# Patient Record
Sex: MALE | Race: WHITE | URBAN - METROPOLITAN AREA
[De-identification: names, ages, dates, MRNs, and addresses within clinical notes are randomized per-mention and may not be internally consistent; named-entity substitution may affect disease eponyms.]

---

## 2018-05-09 ENCOUNTER — HOSPITAL ENCOUNTER (OUTPATIENT)
Dept: GENERAL RADIOLOGY | Age: 53
Discharge: HOME | End: 2018-05-09

## 2018-05-09 DIAGNOSIS — Z00.00 ANNUAL PHYSICAL EXAM: ICD-10-CM

## 2023-07-20 ENCOUNTER — OFFICE VISIT (OUTPATIENT)
Dept: PRIMARY CARE | Facility: CLINIC | Age: 58
End: 2023-07-20
Payer: COMMERCIAL

## 2023-07-20 VITALS
BODY MASS INDEX: 27.07 KG/M2 | DIASTOLIC BLOOD PRESSURE: 79 MMHG | OXYGEN SATURATION: 96 % | HEART RATE: 69 BPM | HEIGHT: 68 IN | WEIGHT: 178.6 LBS | RESPIRATION RATE: 20 BRPM | SYSTOLIC BLOOD PRESSURE: 123 MMHG

## 2023-07-20 DIAGNOSIS — Z91.89 FRAMINGHAM CARDIAC RISK <10% IN NEXT 10 YEARS: ICD-10-CM

## 2023-07-20 DIAGNOSIS — E78.2 MIXED HYPERLIPIDEMIA: ICD-10-CM

## 2023-07-20 DIAGNOSIS — R73.9 BORDERLINE HYPERGLYCEMIA: ICD-10-CM

## 2023-07-20 DIAGNOSIS — I10 ESSENTIAL HYPERTENSION: ICD-10-CM

## 2023-07-20 DIAGNOSIS — E87.6 HYPOKALEMIA: ICD-10-CM

## 2023-07-20 DIAGNOSIS — E66.3 OVERWEIGHT (BMI 25.0-29.9): ICD-10-CM

## 2023-07-20 DIAGNOSIS — E53.8 VITAMIN B12 DEFICIENCY: ICD-10-CM

## 2023-07-20 DIAGNOSIS — R07.89 CHEST WALL PAIN: Primary | ICD-10-CM

## 2023-07-20 DIAGNOSIS — Z11.59 ENCOUNTER FOR HEPATITIS C SCREENING TEST FOR LOW RISK PATIENT: ICD-10-CM

## 2023-07-20 DIAGNOSIS — E55.9 VITAMIN D DEFICIENCY: ICD-10-CM

## 2023-07-20 PROBLEM — K63.5 SERRATED POLYP OF COLON: Status: ACTIVE | Noted: 2023-07-20

## 2023-07-20 PROBLEM — G43.009 MIGRAINE WITHOUT AURA AND WITHOUT STATUS MIGRAINOSUS, NOT INTRACTABLE: Status: ACTIVE | Noted: 2023-07-20

## 2023-07-20 PROBLEM — N52.9 ERECTILE DYSFUNCTION: Status: ACTIVE | Noted: 2023-07-20

## 2023-07-20 PROBLEM — R06.83 SNORING: Status: ACTIVE | Noted: 2023-07-20

## 2023-07-20 PROBLEM — R91.8 LUNG NODULE, MULTIPLE: Status: ACTIVE | Noted: 2023-07-20

## 2023-07-20 PROBLEM — K76.0 HEPATIC STEATOSIS: Status: ACTIVE | Noted: 2023-07-20

## 2023-07-20 PROBLEM — N20.1 URETERAL CALCULUS: Status: ACTIVE | Noted: 2019-10-24

## 2023-07-20 PROBLEM — K21.9 GERD (GASTROESOPHAGEAL REFLUX DISEASE): Status: ACTIVE | Noted: 2023-07-20

## 2023-07-20 PROCEDURE — 93000 ELECTROCARDIOGRAM COMPLETE: CPT | Performed by: INTERNAL MEDICINE

## 2023-07-20 PROCEDURE — 3074F SYST BP LT 130 MM HG: CPT | Performed by: INTERNAL MEDICINE

## 2023-07-20 PROCEDURE — 99214 OFFICE O/P EST MOD 30 MIN: CPT | Performed by: INTERNAL MEDICINE

## 2023-07-20 PROCEDURE — 1036F TOBACCO NON-USER: CPT | Performed by: INTERNAL MEDICINE

## 2023-07-20 PROCEDURE — 3078F DIAST BP <80 MM HG: CPT | Performed by: INTERNAL MEDICINE

## 2023-07-20 RX ORDER — LANOLIN ALCOHOL/MO/W.PET/CERES
CREAM (GRAM) TOPICAL
COMMUNITY
Start: 2023-03-28

## 2023-07-20 RX ORDER — CYCLOBENZAPRINE HCL 5 MG
TABLET ORAL
Qty: 30 TABLET | Refills: 0 | Status: SHIPPED | OUTPATIENT
Start: 2023-07-20

## 2023-07-20 RX ORDER — POTASSIUM CHLORIDE 750 MG/1
TABLET, EXTENDED RELEASE ORAL
COMMUNITY
End: 2023-11-07 | Stop reason: SDUPTHER

## 2023-07-20 RX ORDER — ASPIRIN 81 MG/1
TABLET ORAL
Qty: 30 TABLET | Refills: 1 | Status: SHIPPED | OUTPATIENT
Start: 2023-07-20

## 2023-07-20 RX ORDER — PROPRANOLOL HYDROCHLORIDE 120 MG/1
120 CAPSULE, EXTENDED RELEASE ORAL DAILY
COMMUNITY
Start: 2023-04-01 | End: 2024-01-26 | Stop reason: SDUPTHER

## 2023-07-20 RX ORDER — ASCORBIC ACID 500 MG
500 TABLET ORAL DAILY
Qty: 30 TABLET | Refills: 11 | COMMUNITY
Start: 2023-07-20

## 2023-07-20 RX ORDER — CHOLECALCIFEROL (VITAMIN D3) 50 MCG
50 TABLET ORAL DAILY
Qty: 30 TABLET | Refills: 11 | COMMUNITY
Start: 2023-07-20

## 2023-07-20 RX ORDER — CHLORTHALIDONE 25 MG/1
25 TABLET ORAL
COMMUNITY
End: 2023-11-27

## 2023-07-20 RX ORDER — TADALAFIL 10 MG/1
TABLET ORAL
COMMUNITY
End: 2024-01-26 | Stop reason: SDUPTHER

## 2023-07-20 NOTE — PROGRESS NOTES
Patient is here for routine  visitSubjective   Patient ID: Figueroa Cabral is a 58 y.o. male who presents for Follow-up.    HPI     Review of Systems    Objective   There were no vitals taken for this visit.    Physical Exam    Assessment/Plan        Thank you very much for coming.  I am very happy to see you.    We are going to do a twelve-lead EKG today to determine whether or not we have to worry about this chest discomfort that you have been having.  The presentation of course is ATYPICAL, but you do have the usual risk factors, age/gender, history of cholesterol elevation, history of elevated blood pressure.  Still, you do not smoke, and again, the pain is more superficial.    The next step will be to do a STRESS test, as well as a CARDIAC CT CALCIUM SCORE.  These tests will help determine your overall cardiovascular risk, and whether or not you need to be seen by CARDIOLOGY.    In the meantime, taking into account your age, gender, blood pressure, and latest laboratory examinations, and the fact that you do not smoke, and are not diabetic, your overall risk for heart attack or stroke over the next 10 years is only 5.7% any value less than 10% is very good, and any value less than 7.5% is excellent!  We will just have to complete the proper work-up, and if nothing comes up, then we do not have to worry about any further cardiovascular work-up.    ALSO, it is time to update your FASTING laboratory examinations, to be done at [facility].  I will call you with results and any possible changes.    Please come back in 2 months, so that we can review how you have been, and so that we can also review your preventive strategies, and prepare you for autumn/winter.    Until then, it will benefit you to start a BABY ASPIRIN with BREAKFAST every morning.  Always with food please.  You can even take 1 baby aspirin tonight with dinner.    ALSO, you will benefit from SHINGLES vaccination anytime soon.  The SHINGRIX  series comes into injections at least 1 to 2 months apart.  Get these done now while the weather is good, and the influenza season has not yet started.  Please contact your local pharmacy and get it scheduled.  They will be very happy to accommodate you.    CYCLOBENZAPRINE 5 mg every 6-8 hours as needed for musculoskeletal pain can help your chest wall discomfort.  Also, you can get yourself some BIOFREEZE over-the-counter.  Please read the instructions for proper use and maximum good effect.    Please continue to take care of yourself and your family, and please continue to pray for our recovery from this pandemic.  I hope you enjoy the rest of your summer.  Say deepika to Dann for me!            0  Return in 2 months.  20 minutes please.  Reassess overall cardiovascular risk, preventive strategies.  Prepare for fall/winter.            0

## 2023-07-20 NOTE — PATIENT INSTRUCTIONS
Thank you very much for coming.  I am very happy to see you.    We are going to do a twelve-lead EKG today to determine whether or not we have to worry about this chest discomfort that you have been having.  The presentation of course is ATYPICAL, but you do have the usual risk factors, age/gender, history of cholesterol elevation, history of elevated blood pressure.  Still, you do not smoke, and again, the pain is more superficial.    The next step will be to do a STRESS test, as well as a CARDIAC CT CALCIUM SCORE.  These tests will help determine your overall cardiovascular risk, and whether or not you need to be seen by CARDIOLOGY.    In the meantime, taking into account your age, gender, blood pressure, and latest laboratory examinations, and the fact that you do not smoke, and are not diabetic, your overall risk for heart attack or stroke over the next 10 years is only 5.7% any value less than 10% is very good, and any value less than 7.5% is excellent!  We will just have to complete the proper work-up, and if nothing comes up, then we do not have to worry about any further cardiovascular work-up.    ALSO, it is time to update your FASTING laboratory examinations, to be done at [facility].  I will call you with results and any possible changes.    Please come back in 2 months, so that we can review how you have been, and so that we can also review your preventive strategies, and prepare you for rios/winter.    Until then, it will benefit you to start a BABY ASPIRIN with BREAKFAST every morning.  Always with food please.  You can even take 1 baby aspirin tonight with dinner.    ALSO, you will benefit from SHINGLES vaccination anytime soon.  The SHINGRIX series comes into injections at least 1 to 2 months apart.  Get these done now while the weather is good, and the influenza season has not yet started.  Please contact your local pharmacy and get it scheduled.  They will be very happy to accommodate  you.    CYCLOBENZAPRINE 5 mg every 6-8 hours as needed for musculoskeletal pain can help your chest wall discomfort.  Also, you can get yourself some BIOFREEZE over-the-counter.  Please read the instructions for proper use and maximum good effect.    Please continue to take care of yourself and your family, and please continue to pray for our recovery from this pandemic.  I hope you enjoy the rest of your summer.  Say deepika to Dann for me!            0  Return in 2 months.  20 minutes please.  Reassess overall cardiovascular risk, preventive strategies.  Prepare for fall/winter.            0

## 2023-08-01 ENCOUNTER — TELEPHONE (OUTPATIENT)
Dept: PRIMARY CARE | Facility: CLINIC | Age: 58
End: 2023-08-01
Payer: COMMERCIAL

## 2023-09-15 ENCOUNTER — LAB (OUTPATIENT)
Dept: LAB | Facility: LAB | Age: 58
End: 2023-09-15
Payer: COMMERCIAL

## 2023-09-15 LAB
ALANINE AMINOTRANSFERASE (SGPT) (U/L) IN SER/PLAS: 18 U/L (ref 10–52)
ALBUMIN (G/DL) IN SER/PLAS: 4.5 G/DL (ref 3.4–5)
ALKALINE PHOSPHATASE (U/L) IN SER/PLAS: 58 U/L (ref 33–120)
ANION GAP IN SER/PLAS: 13 MMOL/L (ref 10–20)
APPEARANCE, URINE: CLEAR
ASPARTATE AMINOTRANSFERASE (SGOT) (U/L) IN SER/PLAS: 20 U/L (ref 9–39)
BASOPHILS (10*3/UL) IN BLOOD BY AUTOMATED COUNT: 0.09 X10E9/L (ref 0–0.1)
BASOPHILS/100 LEUKOCYTES IN BLOOD BY AUTOMATED COUNT: 1.6 % (ref 0–2)
BILIRUBIN TOTAL (MG/DL) IN SER/PLAS: 0.9 MG/DL (ref 0–1.2)
BILIRUBIN, URINE: NEGATIVE
BLOOD, URINE: NEGATIVE
CALCIDIOL (25 OH VITAMIN D3) (NG/ML) IN SER/PLAS: 47 NG/ML
CALCIUM (MG/DL) IN SER/PLAS: 9.3 MG/DL (ref 8.6–10.3)
CARBON DIOXIDE, TOTAL (MMOL/L) IN SER/PLAS: 30 MMOL/L (ref 21–32)
CHLORIDE (MMOL/L) IN SER/PLAS: 99 MMOL/L (ref 98–107)
CHOLESTEROL (MG/DL) IN SER/PLAS: 216 MG/DL (ref 0–199)
CHOLESTEROL IN HDL (MG/DL) IN SER/PLAS: 44.1 MG/DL
CHOLESTEROL/HDL RATIO: 4.9
COBALAMIN (VITAMIN B12) (PG/ML) IN SER/PLAS: 300 PG/ML (ref 211–911)
COLOR, URINE: YELLOW
CREATININE (MG/DL) IN SER/PLAS: 0.88 MG/DL (ref 0.5–1.3)
EOSINOPHILS (10*3/UL) IN BLOOD BY AUTOMATED COUNT: 0.41 X10E9/L (ref 0–0.7)
EOSINOPHILS/100 LEUKOCYTES IN BLOOD BY AUTOMATED COUNT: 7.3 % (ref 0–6)
ERYTHROCYTE DISTRIBUTION WIDTH (RATIO) BY AUTOMATED COUNT: 12.4 % (ref 11.5–14.5)
ERYTHROCYTE MEAN CORPUSCULAR HEMOGLOBIN CONCENTRATION (G/DL) BY AUTOMATED: 33.7 G/DL (ref 32–36)
ERYTHROCYTE MEAN CORPUSCULAR VOLUME (FL) BY AUTOMATED COUNT: 88 FL (ref 80–100)
ERYTHROCYTES (10*6/UL) IN BLOOD BY AUTOMATED COUNT: 5.26 X10E12/L (ref 4.5–5.9)
ESTIMATED AVERAGE GLUCOSE FOR HBA1C: 105 MG/DL
FOLATE (NG/ML) IN SER/PLAS: >22.3 NG/ML
GFR MALE: >90 ML/MIN/1.73M2
GLUCOSE (MG/DL) IN SER/PLAS: 95 MG/DL (ref 74–99)
GLUCOSE, URINE: NEGATIVE MG/DL
HEMATOCRIT (%) IN BLOOD BY AUTOMATED COUNT: 46.3 % (ref 41–52)
HEMOGLOBIN (G/DL) IN BLOOD: 15.6 G/DL (ref 13.5–17.5)
HEMOGLOBIN A1C/HEMOGLOBIN TOTAL IN BLOOD: 5.3 %
IMMATURE GRANULOCYTES/100 LEUKOCYTES IN BLOOD BY AUTOMATED COUNT: 0.7 % (ref 0–0.9)
KETONES, URINE: NEGATIVE MG/DL
LDL: 138 MG/DL (ref 0–99)
LEUKOCYTE ESTERASE, URINE: NEGATIVE
LEUKOCYTES (10*3/UL) IN BLOOD BY AUTOMATED COUNT: 5.6 X10E9/L (ref 4.4–11.3)
LYMPHOCYTES (10*3/UL) IN BLOOD BY AUTOMATED COUNT: 1.56 X10E9/L (ref 1.2–4.8)
LYMPHOCYTES/100 LEUKOCYTES IN BLOOD BY AUTOMATED COUNT: 27.7 % (ref 13–44)
MAGNESIUM (MG/DL) IN SER/PLAS: 1.88 MG/DL (ref 1.6–2.4)
MONOCYTES (10*3/UL) IN BLOOD BY AUTOMATED COUNT: 0.78 X10E9/L (ref 0.1–1)
MONOCYTES/100 LEUKOCYTES IN BLOOD BY AUTOMATED COUNT: 13.9 % (ref 2–10)
NEUTROPHILS (10*3/UL) IN BLOOD BY AUTOMATED COUNT: 2.75 X10E9/L (ref 1.2–7.7)
NEUTROPHILS/100 LEUKOCYTES IN BLOOD BY AUTOMATED COUNT: 48.8 % (ref 40–80)
NITRITE, URINE: NEGATIVE
PH, URINE: 6 (ref 5–8)
PLATELETS (10*3/UL) IN BLOOD AUTOMATED COUNT: 261 X10E9/L (ref 150–450)
POTASSIUM (MMOL/L) IN SER/PLAS: 3.5 MMOL/L (ref 3.5–5.3)
PROTEIN TOTAL: 7.3 G/DL (ref 6.4–8.2)
PROTEIN, URINE: NEGATIVE MG/DL
SODIUM (MMOL/L) IN SER/PLAS: 138 MMOL/L (ref 136–145)
SPECIFIC GRAVITY, URINE: 1.02 (ref 1–1.03)
THYROTROPIN (MIU/L) IN SER/PLAS BY DETECTION LIMIT <= 0.05 MIU/L: 1.56 MIU/L (ref 0.44–3.98)
TRIGLYCERIDE (MG/DL) IN SER/PLAS: 169 MG/DL (ref 0–149)
UREA NITROGEN (MG/DL) IN SER/PLAS: 19 MG/DL (ref 6–23)
UROBILINOGEN, URINE: <2 MG/DL (ref 0–1.9)
VLDL: 34 MG/DL (ref 0–40)

## 2023-09-21 ENCOUNTER — APPOINTMENT (OUTPATIENT)
Dept: PRIMARY CARE | Facility: CLINIC | Age: 58
End: 2023-09-21
Payer: COMMERCIAL

## 2023-11-07 ENCOUNTER — OFFICE VISIT (OUTPATIENT)
Dept: PRIMARY CARE | Facility: CLINIC | Age: 58
End: 2023-11-07
Payer: COMMERCIAL

## 2023-11-07 VITALS
SYSTOLIC BLOOD PRESSURE: 130 MMHG | HEIGHT: 68 IN | HEART RATE: 70 BPM | BODY MASS INDEX: 28.04 KG/M2 | RESPIRATION RATE: 20 BRPM | OXYGEN SATURATION: 98 % | DIASTOLIC BLOOD PRESSURE: 88 MMHG | WEIGHT: 185 LBS

## 2023-11-07 DIAGNOSIS — E78.2 MIXED HYPERLIPIDEMIA: ICD-10-CM

## 2023-11-07 DIAGNOSIS — I10 ESSENTIAL HYPERTENSION: Primary | ICD-10-CM

## 2023-11-07 DIAGNOSIS — R73.9 BORDERLINE HYPERGLYCEMIA: ICD-10-CM

## 2023-11-07 DIAGNOSIS — E55.9 VITAMIN D DEFICIENCY: ICD-10-CM

## 2023-11-07 DIAGNOSIS — Z11.59 ENCOUNTER FOR HEPATITIS C SCREENING TEST FOR LOW RISK PATIENT: ICD-10-CM

## 2023-11-07 DIAGNOSIS — E87.6 HYPOKALEMIA: ICD-10-CM

## 2023-11-07 DIAGNOSIS — R91.8 LUNG NODULE, MULTIPLE: ICD-10-CM

## 2023-11-07 DIAGNOSIS — Z91.89 FRAMINGHAM CARDIAC RISK <10% IN NEXT 10 YEARS: ICD-10-CM

## 2023-11-07 DIAGNOSIS — E53.8 VITAMIN B12 DEFICIENCY: ICD-10-CM

## 2023-11-07 DIAGNOSIS — E66.3 OVERWEIGHT (BMI 25.0-29.9): ICD-10-CM

## 2023-11-07 PROCEDURE — 99214 OFFICE O/P EST MOD 30 MIN: CPT | Performed by: INTERNAL MEDICINE

## 2023-11-07 PROCEDURE — 3079F DIAST BP 80-89 MM HG: CPT | Performed by: INTERNAL MEDICINE

## 2023-11-07 PROCEDURE — 1036F TOBACCO NON-USER: CPT | Performed by: INTERNAL MEDICINE

## 2023-11-07 PROCEDURE — 90686 IIV4 VACC NO PRSV 0.5 ML IM: CPT | Performed by: INTERNAL MEDICINE

## 2023-11-07 PROCEDURE — 90471 IMMUNIZATION ADMIN: CPT | Performed by: INTERNAL MEDICINE

## 2023-11-07 PROCEDURE — 3075F SYST BP GE 130 - 139MM HG: CPT | Performed by: INTERNAL MEDICINE

## 2023-11-07 RX ORDER — POTASSIUM CHLORIDE 750 MG/1
TABLET, FILM COATED, EXTENDED RELEASE ORAL
Qty: 180 TABLET | Refills: 0
Start: 2023-11-07 | End: 2023-11-07 | Stop reason: SDUPTHER

## 2023-11-07 RX ORDER — POTASSIUM CHLORIDE 750 MG/1
TABLET, FILM COATED, EXTENDED RELEASE ORAL
Qty: 180 TABLET | Refills: 0 | Status: SHIPPED | OUTPATIENT
Start: 2023-11-07 | End: 2023-12-26

## 2023-11-07 NOTE — PROGRESS NOTES
"Subjective   Patient ID: Figueroa Cabral is a 58 y.o. male who presents for Follow-up.    HPI   The patient is here for his yearly physical examination.  He has been compliant with medications, diet, exercise, and has been tolerating his regimens.  No flora chest pain.  No orthopnea, no paroxysmal nocturnal dyspnea.  No dizziness, diaphoresis, palpitations.  No loss of consciousness.  No bipedal edema.  No remarkable dyspnea on exertion.  No headache, blurred vision, diplopia.  No dysphagia.  No focal weakness, ataxia, clumsiness.  No falls.  No paresthesia.  No confusion or delirium, with patient not worried about memory.  Not depressive or suicidal, with patient not wishing harm to self or others.      Appetite preserved, with no nausea, vomiting, abdominal distress.  No diarrhea, no constipation.  No apparent blood in stool.  No apparent weight loss.  No dysuria, flank, suprapubic pain.  No discharge, no pruritus.  No rash.  No malodor.  No hesitancy, no feeling of incomplete voiding.  No skin changes, rashes, pruritus, jaundice.  No easy bruisability.  ENDOCRINE with no polyuria, polydipsia, polyphagia.  No blurred vision.  No skin, hair, nail changes.  No dramatic weight loss or weight gain.      No particular coughing, no particular sputum production.  CONSTITUTIONALLY, no fever, no chills.  No night sweats.  No lingering anorexia or nausea.  No apparent lymphadenopathy.  No apparent weight loss.        Review of Systems  Review of systems as in history of present illness, and otherwise, reviewed separately as well, and was unremarkable/negative/noncontributory.          Objective   /88 (BP Location: Left arm, Patient Position: Sitting, BP Cuff Size: Adult)   Pulse 70   Resp 20   Ht 1.727 m (5' 8\")   Wt 83.9 kg (185 lb)   SpO2 98%   BMI 28.13 kg/m²     Physical Exam  In good spirits.  Not in distress or diaphoresis.  Alert, oriented x3.  Amiable.  Not unkempt.  Receptive.  Cheerful.  Appropriate.  " Eager to stay healthy, independent, and productive, and does not wish harm to self or others.  Moderately built, muscular.    HEAD Pink palpebral conjunctivae, anicteric sclerae.  No sinus tenderness.  No tragal tenderness.  Tympanic membranes intact bilaterally.  Mucous membranes moist.  No tonsillopharyngeal congestion.  No thrush.  NECK supple, with no jugular venous distention, no lymph nodes.  No masses.  No bruits.  CARDIOVASCULAR adynamic precordium, with no heaves, thrills, or murmurs.  Regular rate and rhythm.  No bilateral edema.  LUNGS not in distress or diaphoresis.  Not using accessory muscles.  Clear to auscultation bilaterally.  ABDOMEN positive bowel sounds, soft, nontender.  Liver and spleen not palpable.  Traube's space not obliterated.  No masses appreciated.  No herniation appreciated.  GENITALIA with no abnormality, no hernia noted.  Penis with no discharge, no strictures.  Testicles intact.  ANUS no breakdown, no fissuring, no bleeding.  No skin tags, no hemorrhoids.  BACK no costovertebral angle tenderness.  EXTREMITIES no clubbing, no cyanosis.  SKIN with no breakdown, bleeding, jaundice.  NEURO no cranial nerve deficits noted.  No facial asymmetry.  Romberg negative.  No tremors.  PSYCH receptive, appropriate.  Eager to stay healthy and independent and productive.  Continues to want to maintain and improve quality of life.      LABORATORY results reviewed with patient.        Assessment/Plan   Diagnoses and all orders for this visit:  Essential hypertension  -     Follow Up In Primary Care - Established  -     Flu vaccine (IIV4) age 6 months and greater, preservative free  -     Follow Up In Primary Care - Established; Future  -     potassium chloride CR 10 mEq ER tablet; Please take 2 tablets by mouth with BREAKFAST, followed by a glass of water.  Stay upright 30 minutes please.  Do not crush, chew, or split.  Mixed hyperlipidemia  -     Follow Up In Primary Care [facility]  -     Flu  vaccine (IIV4) age 6 months and greater, preservative free  -     Follow Up In Primary Care - Established; Future  Borderline hyperglycemia  -     Follow Up In Primary Care - Established  -     Flu vaccine (IIV4) age 6 months and greater, preservative free  -     Follow Up In Primary [facility]; Future  [place] cardiac risk <10% in next 10 years  Comments:  5.7% July 2023  Orders:  -     Follow Up In Primary Care - Established  -     Flu vaccine (IIV4) age 6 months and greater, preservative free  -     Follow Up In Primary Care - Established; Future  Overweight (BMI 25.0-29.9)  -     Follow Up In Primary Care - Established  -     Flu vaccine (IIV4) age 6 months and greater, preservative free  -     Follow Up In Primary Care - Established; Future  Vitamin D deficiency  -     Follow Up In Primary Care - Established  -     Flu vaccine (IIV4) age 6 months and greater, preservative free  -     Follow Up In Primary Care - Established; Future  Vitamin B12 deficiency  -     Follow Up In Primary Care - Established  -     Flu vaccine (IIV4) age 6 months and greater, preservative free  -     Follow Up In Primary Care - Established; Future  Hypokalemia  -     Follow Up In Primary Care - Established  -     Flu vaccine (IIV4) age 6 months and greater, preservative free  -     Follow Up In Primary Care - Established; Future  -     potassium chloride CR 10 mEq ER tablet; Please take 2 tablets by mouth with BREAKFAST, followed by a glass of water.  Stay upright 30 minutes please.  Do not crush, chew, or split.  Lung nodule, multiple  -     CT chest wo IV contrast; Future  -     Flu vaccine (IIV4) age 6 months and greater, preservative free  -     Follow Up In Primary Care - Established; Future  Encounter for hepatitis C screening test for low risk patient  -     Flu vaccine (IIV4) age 6 months and greater, preservative free  -     Follow Up In Primary Care - Established; Future       Thank you very much for coming.  It is  very nice to see you again.    We did your yearly physical examination, and you seem to be doing well.  Your blood pressure is controlled.  Your throat is clear.  Your heart sounds are nice and crisp and regular.  Your lungs are clear.  Your belly is soft.  You have no leg swelling.    I also reviewed your FASTING laboratory results from September.  Laboratory results were all stable.  Your potassium is borderline.  I would recommend that you take POTASSIUM 10 mEq, 2 tablets by mouth with breakfast followed by a glass of water.  Stay upright for at least 30 minutes to make sure that the medication stays down.    I also reviewed your previous studies.  You will benefit from a repeat CT scan of your CHEST to make sure that the lung nodule that was first noted in April 2020 remains stable.    You will also benefit from INFLUENZA vaccination today.  After 2 weeks, please consider getting vaccinated for COVID with a BOOSTER, but if you decide not to, please protect yourself from exposure.  I would recommend though that if there is an outbreak, you will be better off receiving a booster, which you have tolerated in the past.    Please come back in 6 months.  You will most likely benefit from repeat blood examination in 3 months, which I will remind you of.    I will also call you with CT scan results.    I hope you have a good Thanksgiving, a good [place], and a happy new year.  Please continue to take care of yourself and your family and please continue to pray for our recovery from this pandemic.  See you in 6 months.            0  Return in 6 months.  20 minutes please.  Repeat FASTING laboratory examination, then see me soon after.  Reassess hemodynamics, cardiovascular risk.  Review preventive strategies.            0

## 2023-11-07 NOTE — PATIENT INSTRUCTIONS
Thank you very much for coming.  It is very nice to see you again.    We did your yearly physical examination, and you seem to be doing well.  Your blood pressure is controlled.  Your throat is clear.  Your heart sounds are nice and crisp and regular.  Your lungs are clear.  Your belly is soft.  You have no leg swelling.    I also reviewed your FASTING laboratory results from September.  Laboratory results were all stable.  Your potassium is borderline.  I would recommend that you take POTASSIUM 10 mEq, 2 tablets by mouth with breakfast followed by a glass of water.  Stay upright for at least 30 minutes to make sure that the medication stays down.    I also reviewed your previous studies.  You will benefit from a repeat CT scan of your CHEST to make sure that the lung nodule that was first noted in April 2020 remains stable.    You will also benefit from INFLUENZA vaccination today.  After 2 weeks, please consider getting vaccinated for COVID with a BOOSTER, but if you decide not to, please protect yourself from exposure.  I would recommend though that if there is an outbreak, you will be better off receiving a booster, which you have tolerated in the past.    Please come back in 6 months.  You will most likely benefit from repeat blood examination in 3 months, which I will remind you of.    I will also call you with CT scan results.    I hope you have a good Thanksgiving, a good [place], and a happy new year.  Please continue to take care of yourself and your family and please continue to pray for our recovery from this pandemic.  See you in 6 months.            0  Return in 6 months.  20 minutes please.  Repeat FASTING laboratory examination, then see me soon after.  Reassess hemodynamics, cardiovascular risk.  Review preventive strategies.            0

## 2023-11-24 DIAGNOSIS — I10 ESSENTIAL (PRIMARY) HYPERTENSION: ICD-10-CM

## 2023-11-27 RX ORDER — CHLORTHALIDONE 25 MG/1
25 TABLET ORAL
Qty: 30 TABLET | Refills: 5 | Status: SHIPPED | OUTPATIENT
Start: 2023-11-27 | End: 2024-05-20

## 2023-12-02 ENCOUNTER — ANCILLARY PROCEDURE (OUTPATIENT)
Dept: RADIOLOGY | Facility: CLINIC | Age: 58
End: 2023-12-02
Payer: COMMERCIAL

## 2023-12-02 DIAGNOSIS — R91.8 LUNG NODULE, MULTIPLE: ICD-10-CM

## 2023-12-02 PROCEDURE — 71250 CT THORAX DX C-: CPT | Performed by: RADIOLOGY

## 2023-12-02 PROCEDURE — 71250 CT THORAX DX C-: CPT

## 2023-12-18 ENCOUNTER — TELEMEDICINE (OUTPATIENT)
Dept: PRIMARY CARE | Facility: CLINIC | Age: 58
End: 2023-12-18
Payer: COMMERCIAL

## 2023-12-18 VITALS — TEMPERATURE: 100 F

## 2023-12-18 DIAGNOSIS — R91.8 LUNG NODULE, MULTIPLE: ICD-10-CM

## 2023-12-18 DIAGNOSIS — E86.0 DEHYDRATION: ICD-10-CM

## 2023-12-18 DIAGNOSIS — H10.32 ACUTE CONJUNCTIVITIS OF LEFT EYE, UNSPECIFIED ACUTE CONJUNCTIVITIS TYPE: ICD-10-CM

## 2023-12-18 DIAGNOSIS — J01.00 ACUTE NON-RECURRENT MAXILLARY SINUSITIS: Primary | ICD-10-CM

## 2023-12-18 PROCEDURE — 99213 OFFICE O/P EST LOW 20 MIN: CPT | Performed by: INTERNAL MEDICINE

## 2023-12-18 RX ORDER — LEVOFLOXACIN 500 MG/1
TABLET, FILM COATED ORAL
Qty: 10 TABLET | Refills: 0 | Status: SHIPPED | OUTPATIENT
Start: 2023-12-18

## 2023-12-18 RX ORDER — TOBRAMYCIN 3 MG/ML
SOLUTION/ DROPS OPHTHALMIC
Qty: 5 ML | Refills: 0 | Status: SHIPPED | OUTPATIENT
Start: 2023-12-18

## 2023-12-18 NOTE — PROGRESS NOTES
Subjective   Patient ID: Figueroa Cabral is a 58 y.o. male who presents for Virtual Visit (Pt being seen virtually for routine FU).    HPI   Subacutely ill, 1 week history of upper respiratory symptoms, cough, yellowish to greenish sputum, later progressing to more of a left-sided facial pressure, left-sided runny nose, with left-sided injection and discharge from eye.  Fortunately, no diplopia, no blurred vision.  Likewise, some sore throat, but no dysphagia.  Tested self for COVID x 2, negative results.    Generalized weakness, achiness, low-grade fever 100 °F.  Otherwise, no flora substernal chest pain, no orthopnea, no paroxysmal nocturnal dyspnea.  CONSTITUTIONALLY, no particular night sweats, no particular lymphadenopathy, no apparent weight loss.    No headache, but with some left-sided facial pressure.  No abdominal distress, no diarrhea.  No dysuria, flank, suprapubic pain.  No overlying skin changes.  Patient perhaps a little fatigued, but not frustrated or depressive, and just wants to get better.  Not wishing harm to self or others.        Review of Systems  Review of systems as in history of present illness, and otherwise, reviewed separately as well, and was unremarkable/negative/noncontributory.          Objective   Temp 37.8 °C (100 °F)     Physical Exam  Currently, not in distress, with perhaps some minimal diaphoresis.  Remaining alert, oriented x 3.  Amiable.  Not unkempt.  Cheerful, appropriate.  Wishing to get better, and not wishing harm to self or others.  Speaking in short but complete sentences.    HEAD no scleral icterus.  CARDIOPULMONARY not using any accessory muscles, not seemingly in distress.  No hoarseness, no wheezing appreciated.  MUSCULOSKELETAL easily able to maintain position in space and manipulate surroundings.  SKIN with no apparent jaundice.  NEUROLOGIC with no facial asymmetry, no apparent focal weakness, no tremors appreciated.  PSYCHIATRIC remaining appropriate and eager  to get better.      LABORATORY results reviewed, stable.      Assessment/Plan        Thank you very much for allowing me to see you in the comfort and safety of your home via TELEMEDICINE.  I am glad that you were managing.    Please continue taking PLAIN MUCINEX 1200 mg with breakfast, and again at 4 PM.  This will allow you to mobilize your secretions during waking hours, so that you can sleep better at night.  Please drink lots of fluids throughout the day, very helpful, very important.  Warm salt water gargles often.  Hot tea with honey.  Honey lemon drops.  All of these will help you start feeling better.    Please continue taking FLUTICASONE/FLONASE nasal steroid, 1 spray to each nostril after breakfast, and 1 spray to each nostril after supper.  Please use twice a day.    You will benefit from TOBRAMYCIN EYE drops, 2 drops to both eyes every 4 hours during waking hours for at least 3 days.    Likewise, get yourself some SHANNON'S BABY SHAMPOO, and lather in your hands, then lather onto your lids and lashes as needed.  Please do this before you put in the tobramycin eyedrops.  Rinse thoroughly with water.  Pat dry, then use tobramycin eyedrops.    LEVOFLOXACIN 500 mg tablets, please take 1 tablet by mouth with SUPPER every evening.    Stay home for the next 2 workdays, Tuesday and Wednesday.  You should be responding well with your treatment regimens and return to work on Thursday.  Of course, if you are still ill, please let me know.  If you develop blurred vision or double vision, please call back as well, 911 if urgent.    As you may have heard, the CT scan of your chest did reveal the same nodules as before.  We will discuss the significance of these when you return next visit.  Of course, if your symptoms worsen, we may need to do an x-ray of your chest, but currently, your symptoms seem to be localized to your sinuses.    Again, thank you for allow me to visit.  Please continue to take care of yourself  and your family, and please continue to pray for our recovery from this pandemic.  I hope you have a good rest, and eventually a good [place] and a happy new year!            0

## 2023-12-18 NOTE — PATIENT INSTRUCTIONS
Thank you very much for allowing me to see you in the comfort and safety of your home via TELEMEDICINE.  I am glad that you were managing.    Please continue taking PLAIN MUCINEX 1200 mg with breakfast, and again at 4 PM.  This will allow you to mobilize your secretions during waking hours, so that you can sleep better at night.  Please drink lots of fluids throughout the day, very helpful, very important.  Warm salt water gargles often.  Hot tea with honey.  Honey lemon drops.  All of these will help you start feeling better.    Please continue taking FLUTICASONE/FLONASE nasal steroid, 1 spray to each nostril after breakfast, and 1 spray to each nostril after supper.  Please use twice a day.    You will benefit from TOBRAMYCIN EYE drops, 2 drops to both eyes every 4 hours during waking hours for at least 3 days.    Likewise, get yourself some SHANNON'S BABY SHAMPOO, and lather in your hands, then lather onto your lids and lashes as needed.  Please do this before you put in the tobramycin eyedrops.  Rinse thoroughly with water.  Pat dry, then use tobramycin eyedrops.    LEVOFLOXACIN 500 mg tablets, please take 1 tablet by mouth with SUPPER every evening.    Stay home for the next 2 workdays, Tuesday and Wednesday.  You should be responding well with your treatment regimens and return to work on Thursday.  Of course, if you are still ill, please let me know.  If you develop blurred vision or double vision, please call back as well, 911 if urgent.    As you may have heard, the CT scan of your chest did reveal the same nodules as before.  We will discuss the significance of these when you return next visit.  Of course, if your symptoms worsen, we may need to do an x-ray of your chest, but currently, your symptoms seem to be localized to your sinuses.    Again, thank you for allow me to visit.  Please continue to take care of yourself and your family, and please continue to pray for our recovery from this pandemic.  I  hope you have a good rest, and eventually a good [place] and a happy new year!            0

## 2023-12-21 ENCOUNTER — HOSPITAL ENCOUNTER (EMERGENCY)
Facility: HOSPITAL | Age: 58
Discharge: HOME | End: 2023-12-21
Payer: COMMERCIAL

## 2023-12-21 VITALS
HEART RATE: 80 BPM | TEMPERATURE: 97.3 F | WEIGHT: 179.68 LBS | HEIGHT: 68 IN | OXYGEN SATURATION: 96 % | BODY MASS INDEX: 27.23 KG/M2 | RESPIRATION RATE: 18 BRPM | SYSTOLIC BLOOD PRESSURE: 153 MMHG | DIASTOLIC BLOOD PRESSURE: 90 MMHG

## 2023-12-21 DIAGNOSIS — G43.909 MIGRAINE WITHOUT STATUS MIGRAINOSUS, NOT INTRACTABLE, UNSPECIFIED MIGRAINE TYPE: Primary | ICD-10-CM

## 2023-12-21 PROCEDURE — 2500000004 HC RX 250 GENERAL PHARMACY W/ HCPCS (ALT 636 FOR OP/ED): Performed by: NURSE PRACTITIONER

## 2023-12-21 PROCEDURE — 96361 HYDRATE IV INFUSION ADD-ON: CPT

## 2023-12-21 PROCEDURE — 96375 TX/PRO/DX INJ NEW DRUG ADDON: CPT

## 2023-12-21 PROCEDURE — 96374 THER/PROPH/DIAG INJ IV PUSH: CPT

## 2023-12-21 PROCEDURE — 99284 EMERGENCY DEPT VISIT MOD MDM: CPT | Mod: 25

## 2023-12-21 RX ORDER — DEXAMETHASONE SODIUM PHOSPHATE 10 MG/ML
10 INJECTION INTRAMUSCULAR; INTRAVENOUS ONCE
Status: COMPLETED | OUTPATIENT
Start: 2023-12-21 | End: 2023-12-21

## 2023-12-21 RX ORDER — DIPHENHYDRAMINE HYDROCHLORIDE 50 MG/ML
25 INJECTION INTRAMUSCULAR; INTRAVENOUS ONCE
Status: COMPLETED | OUTPATIENT
Start: 2023-12-21 | End: 2023-12-21

## 2023-12-21 RX ORDER — METOCLOPRAMIDE HYDROCHLORIDE 5 MG/ML
10 INJECTION INTRAMUSCULAR; INTRAVENOUS ONCE
Status: COMPLETED | OUTPATIENT
Start: 2023-12-21 | End: 2023-12-21

## 2023-12-21 RX ORDER — KETOROLAC TROMETHAMINE 30 MG/ML
15 INJECTION, SOLUTION INTRAMUSCULAR; INTRAVENOUS ONCE
Status: COMPLETED | OUTPATIENT
Start: 2023-12-21 | End: 2023-12-21

## 2023-12-21 RX ADMIN — SODIUM CHLORIDE 1000 ML: 9 INJECTION, SOLUTION INTRAVENOUS at 09:22

## 2023-12-21 RX ADMIN — DIPHENHYDRAMINE HYDROCHLORIDE 25 MG: 50 INJECTION, SOLUTION INTRAMUSCULAR; INTRAVENOUS at 09:22

## 2023-12-21 RX ADMIN — METOCLOPRAMIDE HYDROCHLORIDE 10 MG: 5 INJECTION INTRAMUSCULAR; INTRAVENOUS at 09:22

## 2023-12-21 RX ADMIN — KETOROLAC TROMETHAMINE 15 MG: 30 INJECTION, SOLUTION INTRAMUSCULAR; INTRAVENOUS at 09:22

## 2023-12-21 RX ADMIN — DEXAMETHASONE SODIUM PHOSPHATE 10 MG: 10 INJECTION, SOLUTION INTRAMUSCULAR; INTRAVENOUS at 09:22

## 2023-12-21 ASSESSMENT — PAIN - FUNCTIONAL ASSESSMENT
PAIN_FUNCTIONAL_ASSESSMENT: 0-10
PAIN_FUNCTIONAL_ASSESSMENT: 0-10

## 2023-12-21 ASSESSMENT — COLUMBIA-SUICIDE SEVERITY RATING SCALE - C-SSRS
1. IN THE PAST MONTH, HAVE YOU WISHED YOU WERE DEAD OR WISHED YOU COULD GO TO SLEEP AND NOT WAKE UP?: NO
2. HAVE YOU ACTUALLY HAD ANY THOUGHTS OF KILLING YOURSELF?: NO
6. HAVE YOU EVER DONE ANYTHING, STARTED TO DO ANYTHING, OR PREPARED TO DO ANYTHING TO END YOUR LIFE?: NO

## 2023-12-21 ASSESSMENT — LIFESTYLE VARIABLES
EVER HAD A DRINK FIRST THING IN THE MORNING TO STEADY YOUR NERVES TO GET RID OF A HANGOVER: NO
REASON UNABLE TO ASSESS: NO
HAVE YOU EVER FELT YOU SHOULD CUT DOWN ON YOUR DRINKING: NO
HAVE PEOPLE ANNOYED YOU BY CRITICIZING YOUR DRINKING: NO
EVER FELT BAD OR GUILTY ABOUT YOUR DRINKING: NO

## 2023-12-21 ASSESSMENT — PAIN DESCRIPTION - LOCATION: LOCATION: HEAD

## 2023-12-21 ASSESSMENT — PAIN SCALES - GENERAL
PAINLEVEL_OUTOF10: 5 - MODERATE PAIN
PAINLEVEL_OUTOF10: 6
PAINLEVEL_OUTOF10: 1

## 2023-12-21 NOTE — ED PROVIDER NOTES
"HPI   Chief Complaint   Patient presents with    Headache     \"At 0300 this morning I woke up with this  migraine.\"       HPI  58-year-old male presents emergency department with complaints of headache.  Patient states does have history of migraines, takes propranolol.  States he was awoken at 1 AM with severe headache, describes posterior aspect of his head.  Was finally able to get comfortable and go back to sleep around 3 AM, only to be awoken again around 630 with worse pain again.  Patient again states history of migraine type headaches.  Denies any recent injury, states that he did start some antibiotics for a sinus infection about 3 days ago.  No fevers.  Patient states today his symptoms are consistent with his migraine symptoms, although worse than his usual migraines.  Has had to go to the ER once previously for similar severe migraine symptoms.     Describes headache that starts at the back of his neck and head with light sensitivity and nausea.                  Cyndi Coma Scale Score: 15                  Patient History   Past Medical History:   Diagnosis Date    Migraine, unspecified, not intractable, without status migrainosus     Migraines    Pain in unspecified knee     Knee pain    Personal history of other diseases of the circulatory system     History of hypertension    Personal history of other diseases of the digestive system     History of gastroesophageal reflux (GERD)    Personal history of other specified conditions     History of multiple pulmonary nodules     Past Surgical History:   Procedure Laterality Date    COLONOSCOPY W/ POLYPECTOMY  2019    Complete Colonoscopy For Polyp Removal    OTHER SURGICAL HISTORY  2019    Finger surgical procedure    OTHER SURGICAL HISTORY  2019    Wrist surgery     No family history on file.  Social History     Tobacco Use    Smoking status: Former     Types: Cigarettes     Quit date:      Years since quittin.9    Smokeless " tobacco: Never   Vaping Use    Vaping Use: Never used   Substance Use Topics    Alcohol use: Yes    Drug use: Never       Physical Exam   ED Triage Vitals [12/21/23 0829]   Temp Heart Rate Resp BP   36.3 °C (97.3 °F) 72 18 (!) 172/94      SpO2 Temp Source Heart Rate Source Patient Position   94 % Tympanic -- Sitting      BP Location FiO2 (%)     Right arm --       Physical Exam  Physical Exam  Physical Exam:  Constitutional: Vitals noted, no distress. Afebrile.   EENT: PERRLA  Cardiovascular: Regular, rate, rhythm, no murmur.   Pulmonary: Lungs clear bilaterally with good aeration. No adventitious breath sounds.   Gastrointestinal: Soft, nonsurgical. Nontender. No peritoneal signs. Normoactive bowel sounds.   Musculoskeletal: No peripheral edema. Negative Homans bilaterally, no cords.   Skin: No rash.   Neuro: No focal neurologic deficits, NIH score of 0.  ED Course & MDM   Diagnoses as of 12/21/23 1125   Migraine without status migrainosus, not intractable, unspecified migraine type       Medical Decision Making  Patient describes headache, states history of migraine headaches, states this headache is more severe than his normal migraines but similar in presentation.    Discussed medication, given IV Toradol, Reglan, Benadryl and IV fluids as well as 10 mg of IV Decadron.    On reevaluation states much improvement of his headache, now tolerable.  Discussed discharge home for rest and hydration, should follow-up with primary care, return with any worsening symptoms or any additional concerns.    Procedure  Procedures     ROSIE Teague  12/21/23 1125       ROSIE Teague  12/21/23 1125

## 2023-12-22 DIAGNOSIS — I10 ESSENTIAL HYPERTENSION: ICD-10-CM

## 2023-12-22 DIAGNOSIS — E87.6 HYPOKALEMIA: ICD-10-CM

## 2023-12-26 RX ORDER — POTASSIUM CHLORIDE 750 MG/1
TABLET, FILM COATED, EXTENDED RELEASE ORAL
Qty: 180 TABLET | Refills: 0 | Status: SHIPPED | OUTPATIENT
Start: 2023-12-26 | End: 2024-05-16

## 2024-01-26 DIAGNOSIS — G43.009 MIGRAINE WITHOUT AURA AND WITHOUT STATUS MIGRAINOSUS, NOT INTRACTABLE: ICD-10-CM

## 2024-01-26 DIAGNOSIS — N52.9 ERECTILE DYSFUNCTION, UNSPECIFIED ERECTILE DYSFUNCTION TYPE: ICD-10-CM

## 2024-01-26 DIAGNOSIS — I10 ESSENTIAL HYPERTENSION: Primary | ICD-10-CM

## 2024-01-30 RX ORDER — TADALAFIL 10 MG/1
TABLET ORAL
Qty: 15 TABLET | Refills: 0 | Status: SHIPPED | OUTPATIENT
Start: 2024-01-30

## 2024-01-30 RX ORDER — PROPRANOLOL HYDROCHLORIDE 120 MG/1
120 CAPSULE, EXTENDED RELEASE ORAL DAILY
Qty: 90 CAPSULE | Refills: 1 | Status: SHIPPED | OUTPATIENT
Start: 2024-01-30

## 2024-05-15 DIAGNOSIS — E87.6 HYPOKALEMIA: ICD-10-CM

## 2024-05-15 DIAGNOSIS — I10 ESSENTIAL HYPERTENSION: ICD-10-CM

## 2024-05-16 RX ORDER — POTASSIUM CHLORIDE 750 MG/1
TABLET, FILM COATED, EXTENDED RELEASE ORAL
Qty: 180 TABLET | Refills: 0 | Status: SHIPPED | OUTPATIENT
Start: 2024-05-16

## 2024-05-19 DIAGNOSIS — I10 ESSENTIAL (PRIMARY) HYPERTENSION: ICD-10-CM

## 2024-05-20 RX ORDER — CHLORTHALIDONE 25 MG/1
25 TABLET ORAL
Qty: 90 TABLET | Refills: 1 | Status: SHIPPED | OUTPATIENT
Start: 2024-05-20

## 2024-05-30 ENCOUNTER — APPOINTMENT (OUTPATIENT)
Dept: PRIMARY CARE | Facility: CLINIC | Age: 59
End: 2024-05-30
Payer: COMMERCIAL

## 2024-06-19 DIAGNOSIS — N52.9 ERECTILE DYSFUNCTION, UNSPECIFIED ERECTILE DYSFUNCTION TYPE: ICD-10-CM

## 2024-06-25 RX ORDER — TADALAFIL 10 MG/1
TABLET ORAL
Qty: 12 TABLET | Refills: 1 | Status: SHIPPED | OUTPATIENT
Start: 2024-06-25

## 2024-07-11 ENCOUNTER — APPOINTMENT (OUTPATIENT)
Dept: PRIMARY CARE | Facility: CLINIC | Age: 59
End: 2024-07-11
Payer: COMMERCIAL

## 2024-07-11 VITALS
BODY MASS INDEX: 29.08 KG/M2 | DIASTOLIC BLOOD PRESSURE: 80 MMHG | WEIGHT: 191.9 LBS | HEIGHT: 68 IN | HEART RATE: 60 BPM | OXYGEN SATURATION: 96 % | SYSTOLIC BLOOD PRESSURE: 139 MMHG

## 2024-07-11 DIAGNOSIS — Z91.89 FRAMINGHAM CARDIAC RISK 10-20% IN NEXT 10 YEARS: Chronic | ICD-10-CM

## 2024-07-11 DIAGNOSIS — E66.3 OVERWEIGHT (BMI 25.0-29.9): ICD-10-CM

## 2024-07-11 DIAGNOSIS — R91.8 LUNG NODULE, MULTIPLE: Chronic | ICD-10-CM

## 2024-07-11 DIAGNOSIS — M54.31 SCIATICA, RIGHT SIDE: ICD-10-CM

## 2024-07-11 DIAGNOSIS — E55.9 VITAMIN D DEFICIENCY: ICD-10-CM

## 2024-07-11 DIAGNOSIS — E53.8 VITAMIN B12 DEFICIENCY: ICD-10-CM

## 2024-07-11 DIAGNOSIS — E78.2 MIXED HYPERLIPIDEMIA: ICD-10-CM

## 2024-07-11 DIAGNOSIS — Z11.59 ENCOUNTER FOR HEPATITIS C SCREENING TEST FOR LOW RISK PATIENT: ICD-10-CM

## 2024-07-11 DIAGNOSIS — G43.009 MIGRAINE WITHOUT AURA AND WITHOUT STATUS MIGRAINOSUS, NOT INTRACTABLE: ICD-10-CM

## 2024-07-11 DIAGNOSIS — I10 ESSENTIAL HYPERTENSION: Primary | ICD-10-CM

## 2024-07-11 DIAGNOSIS — R73.9 BORDERLINE HYPERGLYCEMIA: ICD-10-CM

## 2024-07-11 PROCEDURE — 99213 OFFICE O/P EST LOW 20 MIN: CPT | Performed by: INTERNAL MEDICINE

## 2024-07-11 PROCEDURE — 1036F TOBACCO NON-USER: CPT | Performed by: INTERNAL MEDICINE

## 2024-07-11 PROCEDURE — 3079F DIAST BP 80-89 MM HG: CPT | Performed by: INTERNAL MEDICINE

## 2024-07-11 PROCEDURE — 3075F SYST BP GE 130 - 139MM HG: CPT | Performed by: INTERNAL MEDICINE

## 2024-07-11 RX ORDER — PROPRANOLOL HYDROCHLORIDE 120 MG/1
120 CAPSULE, EXTENDED RELEASE ORAL DAILY
Qty: 90 CAPSULE | Refills: 1 | Status: SHIPPED | OUTPATIENT
Start: 2024-07-11

## 2024-07-11 RX ORDER — CHLORTHALIDONE 25 MG/1
25 TABLET ORAL
Qty: 90 TABLET | Refills: 0 | Status: SHIPPED | OUTPATIENT
Start: 2024-07-11

## 2024-07-11 NOTE — PROGRESS NOTES
"Subjective   Patient ID: Figueroa Cabral is a 59 y.o. male who presents for Follow-up (Patient presented today for a 6 month follow up./).    HPI   Somewhat lost to follow-up, now here for evaluation of hemodynamics, cardiovascular risk.  Has recently gained some \"happy fat!\"  ENDOCRINE with no polyuria, polydipsia, polyphagia.  No blurred vision.  No skin, hair, nail changes.    Noted initially to have elevated blood pressure.  In the meantime, physically active, lots of brisk walking.  No flora substernal chest pain, no orthopnea, no paroxysmal nocturnal dyspnea.  Also, has been trying to eat more sensibly.    Has had some exacerbation of SCIATICA, this time right-sided.  Otherwise, no particular exacerbation of migraine.  No headache, blurred vision, diplopia.  No dysphagia.  No particular ataxia.    CT scan chest noted, November.  No particular cough, no particular sputum production.  CONSTITUTIONALLY, no fever, no chills.  No night sweats.  No lingering anorexia or nausea.  No apparent lymphadenopathy.  No apparent weight loss.        Review of Systems  Review of systems as in history of present illness, and otherwise, reviewed separately as well, and was unremarkable/negative/noncontributory.        Objective   /80 (BP Location: Left arm, Patient Position: Sitting)   Pulse 60   Ht 1.727 m (5' 8\")   Wt 87 kg (191 lb 14.4 oz)   SpO2 96%   BMI 29.18 kg/m²     Physical Exam  In very good spirits.  Not in distress or diaphoresis.  Alert, oriented x 3.  Amiable.  Not unkempt.  Receptive, cheerful, appropriate, and eager to maintain and hopefully improve quality of life.  Does not wish harm to self or others.    HEAD pink palpebral conjunctivae, anicteric sclerae.  NECK supple, no apparent jugular venous distention.  CARDIOVASCULAR not in distress or diaphoresis.  No bipedal edema.  LUNGS not in distress or diaphoresis.  Not using accessory muscles.  ABDOMEN soft, nontender.  BACK no costovertebral angle " tenderness.  EXTREMITIES no clubbing, no cyanosis.  NEURO no facial asymmetry.  No apparent cranial nerve deficits.  Romberg negative.  Ambulating without need of assistance.  No apparent focal weakness.  No tremors.  PSYCH receptive, appropriate, and eager to maintain and improve quality of life.        LABORATORY results noted, will need updating.  CT scan chest done in November likewise noted.        Assessment/Plan   Diagnoses and all orders for this visit:  Essential hypertension  -     Follow Up In Primary Care - Established  -     chlorthalidone (Hygroton) 25 mg tablet; Take 1 tablet (25 mg) by mouth once daily with breakfast.  -     propranolol LA (Inderal LA) 120 mg 24 hr capsule; Take 1 capsule (120 mg) by mouth once daily.  -     CBC and Auto Differential; Future  -     Comprehensive Metabolic Panel; Future  -     TSH with reflex to Free T4 if abnormal; Future  -     Magnesium; Future  -     Urinalysis with Reflex Culture and Microscopic; Future  -     Creatine Kinase; Future  -     Follow Up In Primary Care - Established; Future  Mixed hyperlipidemia  -     Comprehensive Metabolic Panel; Future  -     Lipid Panel; Future  -     Follow Up In Primary Care - Established; Future  Borderline hyperglycemia  -     Comprehensive Metabolic Panel; Future  -     Urinalysis with Reflex Culture and Microscopic; Future  -     Hemoglobin A1C; Future  -     Follow Up In Primary Care - Established; Future  [place] cardiac risk 10-20% in next 10 years  Comments:  12.6% 07/2024  Orders:  -     Comprehensive Metabolic Panel; Future  -     Lipid Panel; Future  -     Hemoglobin A1C; Future  -     Follow Up In Primary Care - Established; Future  Overweight (BMI 25.0-29.9)  -     Comprehensive Metabolic Panel; Future  -     TSH with reflex to Free T4 if abnormal; Future  -     Follow Up In Primary Care - Established; Future  Vitamin D deficiency  -     Comprehensive Metabolic Panel; Future  -     Vitamin D 25-Hydroxy,Total  (for eval of Vitamin D levels); Future  -     Follow Up In Primary Care - Established; Future  Vitamin B12 deficiency  -     CBC and Auto Differential; Future  -     Vitamin B12; Future  -     Folate; Future  -     Follow Up In Primary Care - Established; Future  Lung nodule, multiple  Comments:  CT 11/2024  Orders:  -     CBC and Auto Differential; Future  -     Follow Up In Primary Care - Established; Future  Migraine without aura and without status migrainosus, not intractable  -     propranolol LA (Inderal LA) 120 mg 24 hr capsule; Take 1 capsule (120 mg) by mouth once daily.  -     CBC and Auto Differential; Future  -     Comprehensive Metabolic Panel; Future  -     TSH with reflex to Free T4 if abnormal; Future  -     Magnesium; Future  -     Follow Up In Primary Care - Established; Future  Encounter for hepatitis C screening test for low risk patient  -     Comprehensive Metabolic Panel; Future  -     Hepatitis C Antibody; Future  -     Follow Up In Primary Care - Established; Future  Sciatica, right side  -     Comprehensive Metabolic Panel; Future  -     Vitamin B12; Future  -     Folate; Future  -     Uric Acid; Future  -     Creatine Kinase; Future  -     Follow Up In Primary Care - Established; Future       Thank you much for coming.  I am very happy to see you.    With your blood pressure initially being elevated like this, you will benefit from FASTING laboratory examinations soon, then afterwards, return and we will do a twelve-lead EKG and make sure that it is safe for you to start on PHENTERMINE/ADIPEX.    Until then, you are already doing everything that you should do.  Please continue staying physically active.  Brisk walking, cycling, swimming, vigorous activities at least 20 minutes a day, every day, including Sunday!  I do appreciate that you are getting your steps in!    Likewise, please continue eating sensibly.  [employer] diet book, DASH diet, Mediterranean diet, all very good for  ideas!    I do understand that you have been having recurrence of SCIATICA, right side.  Go ahead and see your local chiropractor, and give this about 2 weeks.  If you are not improving, please let me know.    In the meantime, go ahead and get yourself some over-the-counter IBUPROFEN 200 mg.  Take 3 tablets by mouth every 6-8 hours as needed for pain, ALWAYS with FOOD.    Again, thank you very much for coming.  See you in 3 weeks.  Call sooner as needed.  I will send word regarding results and possible changes.  Do not forget to do my FASTING laboratory examinations soon, so that we will have time to do some changes until I see you again.    You will be due for a repeat CT scan of your chest in November.  Until then, I reviewed your previous study, and it was stable from last November.    Please continue to take care of yourself and your family, and please continue to pray for our recovery from this pandemic.  Let us see what we can do about your happy fat!  See you in 3 weeks.  Take care and God bless.            0  Return in 3 weeks.  20 minutes please.  Twelve-lead EKG, discuss possibly starting phentermine/Adipex, other possible options regarding weight management.  Review preventive strategies, cardiovascular risk.            0

## 2024-07-11 NOTE — PATIENT INSTRUCTIONS
Thank you much for coming.  I am very happy to see you.    With your blood pressure initially being elevated like this, you will benefit from FASTING laboratory examinations soon, then afterwards, return and we will do a twelve-lead EKG and make sure that it is safe for you to start on PHENTERMINE/ADIPEX.    Until then, you are already doing everything that you should do.  Please continue staying physically active.  Brisk walking, cycling, swimming, vigorous activities at least 20 minutes a day, every day, including Sunday!  I do appreciate that you are getting your steps in!    Likewise, please continue eating sensibly.  [employer] diet book, DASH diet, Mediterranean diet, all very good for ideas!    I do understand that you have been having recurrence of SCIATICA, right side.  Go ahead and see your local chiropractor, and give this about 2 weeks.  If you are not improving, please let me know.    In the meantime, go ahead and get yourself some over-the-counter IBUPROFEN 200 mg.  Take 3 tablets by mouth every 6-8 hours as needed for pain, ALWAYS with FOOD.    Again, thank you very much for coming.  See you in 3 weeks.  Call sooner as needed.  I will send word regarding results and possible changes.  Do not forget to do my FASTING laboratory examinations soon, so that we will have time to do some changes until I see you again.    You will be due for a repeat CT scan of your chest in November.  Until then, I reviewed your previous study, and it was stable from last November.    Please continue to take care of yourself and your family, and please continue to pray for our recovery from this pandemic.  Let us see what we can do about your happy fat!  See you in 3 weeks.  Take care and God bless.            0  Return in 3 weeks.  20 minutes please.  Twelve-lead EKG, discuss possibly starting phentermine/Adipex, other possible options regarding weight management.  Review preventive strategies, cardiovascular  risk.            0

## 2024-07-16 ENCOUNTER — LAB (OUTPATIENT)
Dept: LAB | Facility: LAB | Age: 59
End: 2024-07-16
Payer: COMMERCIAL

## 2024-07-16 DIAGNOSIS — I10 ESSENTIAL HYPERTENSION: ICD-10-CM

## 2024-07-16 DIAGNOSIS — R73.9 BORDERLINE HYPERGLYCEMIA: ICD-10-CM

## 2024-07-16 DIAGNOSIS — R91.8 LUNG NODULE, MULTIPLE: Chronic | ICD-10-CM

## 2024-07-16 DIAGNOSIS — M54.31 SCIATICA, RIGHT SIDE: ICD-10-CM

## 2024-07-16 DIAGNOSIS — E66.3 OVERWEIGHT (BMI 25.0-29.9): ICD-10-CM

## 2024-07-16 DIAGNOSIS — Z11.59 ENCOUNTER FOR HEPATITIS C SCREENING TEST FOR LOW RISK PATIENT: ICD-10-CM

## 2024-07-16 DIAGNOSIS — Z91.89 FRAMINGHAM CARDIAC RISK 10-20% IN NEXT 10 YEARS: Chronic | ICD-10-CM

## 2024-07-16 DIAGNOSIS — G43.009 MIGRAINE WITHOUT AURA AND WITHOUT STATUS MIGRAINOSUS, NOT INTRACTABLE: ICD-10-CM

## 2024-07-16 DIAGNOSIS — E55.9 VITAMIN D DEFICIENCY: ICD-10-CM

## 2024-07-16 DIAGNOSIS — E53.8 VITAMIN B12 DEFICIENCY: ICD-10-CM

## 2024-07-16 DIAGNOSIS — E78.2 MIXED HYPERLIPIDEMIA: ICD-10-CM

## 2024-07-16 LAB
25(OH)D3 SERPL-MCNC: 25 NG/ML (ref 30–100)
ALBUMIN SERPL BCP-MCNC: 4.2 G/DL (ref 3.4–5)
ALP SERPL-CCNC: 54 U/L (ref 33–120)
ALT SERPL W P-5'-P-CCNC: 19 U/L (ref 10–52)
ANION GAP SERPL CALC-SCNC: 11 MMOL/L (ref 10–20)
APPEARANCE UR: CLEAR
AST SERPL W P-5'-P-CCNC: 16 U/L (ref 9–39)
BASOPHILS # BLD AUTO: 0.09 X10*3/UL (ref 0–0.1)
BASOPHILS NFR BLD AUTO: 1.5 %
BILIRUB SERPL-MCNC: 0.6 MG/DL (ref 0–1.2)
BILIRUB UR STRIP.AUTO-MCNC: NEGATIVE MG/DL
BUN SERPL-MCNC: 19 MG/DL (ref 6–23)
CALCIUM SERPL-MCNC: 9.3 MG/DL (ref 8.6–10.3)
CHLORIDE SERPL-SCNC: 101 MMOL/L (ref 98–107)
CHOLEST SERPL-MCNC: 213 MG/DL (ref 0–199)
CHOLESTEROL/HDL RATIO: 4.9
CK SERPL-CCNC: 91 U/L (ref 0–325)
CO2 SERPL-SCNC: 32 MMOL/L (ref 21–32)
COLOR UR: NORMAL
CREAT SERPL-MCNC: 0.9 MG/DL (ref 0.5–1.3)
EGFRCR SERPLBLD CKD-EPI 2021: >90 ML/MIN/1.73M*2
EOSINOPHIL # BLD AUTO: 0.43 X10*3/UL (ref 0–0.7)
EOSINOPHIL NFR BLD AUTO: 7 %
ERYTHROCYTE [DISTWIDTH] IN BLOOD BY AUTOMATED COUNT: 12.8 % (ref 11.5–14.5)
EST. AVERAGE GLUCOSE BLD GHB EST-MCNC: 108 MG/DL
FOLATE SERPL-MCNC: 20.4 NG/ML
GLUCOSE SERPL-MCNC: 105 MG/DL (ref 74–99)
GLUCOSE UR STRIP.AUTO-MCNC: NORMAL MG/DL
HBA1C MFR BLD: 5.4 %
HCT VFR BLD AUTO: 44.8 % (ref 41–52)
HCV AB SER QL: NONREACTIVE
HDLC SERPL-MCNC: 43.4 MG/DL
HGB BLD-MCNC: 14.9 G/DL (ref 13.5–17.5)
HOLD SPECIMEN: NORMAL
IMM GRANULOCYTES # BLD AUTO: 0.09 X10*3/UL (ref 0–0.7)
IMM GRANULOCYTES NFR BLD AUTO: 1.5 % (ref 0–0.9)
KETONES UR STRIP.AUTO-MCNC: NEGATIVE MG/DL
LDLC SERPL CALC-MCNC: 113 MG/DL
LEUKOCYTE ESTERASE UR QL STRIP.AUTO: NEGATIVE
LYMPHOCYTES # BLD AUTO: 1.47 X10*3/UL (ref 1.2–4.8)
LYMPHOCYTES NFR BLD AUTO: 24.1 %
MAGNESIUM SERPL-MCNC: 1.82 MG/DL (ref 1.6–2.4)
MCH RBC QN AUTO: 30.2 PG (ref 26–34)
MCHC RBC AUTO-ENTMCNC: 33.3 G/DL (ref 32–36)
MCV RBC AUTO: 91 FL (ref 80–100)
MONOCYTES # BLD AUTO: 0.64 X10*3/UL (ref 0.1–1)
MONOCYTES NFR BLD AUTO: 10.5 %
NEUTROPHILS # BLD AUTO: 3.39 X10*3/UL (ref 1.2–7.7)
NEUTROPHILS NFR BLD AUTO: 55.4 %
NITRITE UR QL STRIP.AUTO: NEGATIVE
NON HDL CHOLESTEROL: 170 MG/DL (ref 0–149)
NRBC BLD-RTO: 0 /100 WBCS (ref 0–0)
PH UR STRIP.AUTO: 6.5 [PH]
PLATELET # BLD AUTO: 237 X10*3/UL (ref 150–450)
POTASSIUM SERPL-SCNC: 3.6 MMOL/L (ref 3.5–5.3)
PROT SERPL-MCNC: 6.6 G/DL (ref 6.4–8.2)
PROT UR STRIP.AUTO-MCNC: NEGATIVE MG/DL
RBC # BLD AUTO: 4.94 X10*6/UL (ref 4.5–5.9)
RBC # UR STRIP.AUTO: NEGATIVE /UL
SODIUM SERPL-SCNC: 140 MMOL/L (ref 136–145)
SP GR UR STRIP.AUTO: 1.02
TRIGL SERPL-MCNC: 281 MG/DL (ref 0–149)
TSH SERPL-ACNC: 1.43 MIU/L (ref 0.44–3.98)
URATE SERPL-MCNC: 6.6 MG/DL (ref 4–7.5)
UROBILINOGEN UR STRIP.AUTO-MCNC: NORMAL MG/DL
VIT B12 SERPL-MCNC: 712 PG/ML (ref 211–911)
VLDL: 56 MG/DL (ref 0–40)
WBC # BLD AUTO: 6.1 X10*3/UL (ref 4.4–11.3)

## 2024-07-16 PROCEDURE — 83735 ASSAY OF MAGNESIUM: CPT

## 2024-07-16 PROCEDURE — 36415 COLL VENOUS BLD VENIPUNCTURE: CPT

## 2024-07-16 PROCEDURE — 84550 ASSAY OF BLOOD/URIC ACID: CPT

## 2024-07-16 PROCEDURE — 85025 COMPLETE CBC W/AUTO DIFF WBC: CPT

## 2024-07-16 PROCEDURE — 82550 ASSAY OF CK (CPK): CPT

## 2024-07-16 PROCEDURE — 84443 ASSAY THYROID STIM HORMONE: CPT

## 2024-07-16 PROCEDURE — 82746 ASSAY OF FOLIC ACID SERUM: CPT

## 2024-07-16 PROCEDURE — 83036 HEMOGLOBIN GLYCOSYLATED A1C: CPT

## 2024-07-16 PROCEDURE — 82306 VITAMIN D 25 HYDROXY: CPT

## 2024-07-16 PROCEDURE — 80053 COMPREHEN METABOLIC PANEL: CPT

## 2024-07-16 PROCEDURE — 82607 VITAMIN B-12: CPT

## 2024-07-16 PROCEDURE — 80061 LIPID PANEL: CPT

## 2024-07-16 PROCEDURE — 86803 HEPATITIS C AB TEST: CPT

## 2024-07-16 PROCEDURE — 81003 URINALYSIS AUTO W/O SCOPE: CPT

## 2024-08-06 ENCOUNTER — APPOINTMENT (OUTPATIENT)
Dept: PRIMARY CARE | Facility: CLINIC | Age: 59
End: 2024-08-06
Payer: COMMERCIAL

## 2024-08-06 VITALS
OXYGEN SATURATION: 96 % | HEART RATE: 70 BPM | BODY MASS INDEX: 28.02 KG/M2 | WEIGHT: 184.9 LBS | DIASTOLIC BLOOD PRESSURE: 83 MMHG | HEIGHT: 68 IN | SYSTOLIC BLOOD PRESSURE: 124 MMHG

## 2024-08-06 DIAGNOSIS — R94.31 ABNORMAL EKG: Chronic | ICD-10-CM

## 2024-08-06 DIAGNOSIS — R07.89 ATYPICAL CHEST PAIN: Primary | ICD-10-CM

## 2024-08-06 DIAGNOSIS — K21.9 GASTROESOPHAGEAL REFLUX DISEASE, UNSPECIFIED WHETHER ESOPHAGITIS PRESENT: ICD-10-CM

## 2024-08-06 DIAGNOSIS — E66.3 OVERWEIGHT (BMI 25.0-29.9): ICD-10-CM

## 2024-08-06 DIAGNOSIS — I10 ESSENTIAL HYPERTENSION: ICD-10-CM

## 2024-08-06 DIAGNOSIS — Z91.89 FRAMINGHAM CARDIAC RISK 10-20% IN NEXT 10 YEARS: Chronic | ICD-10-CM

## 2024-08-06 DIAGNOSIS — E78.2 MIXED HYPERLIPIDEMIA: ICD-10-CM

## 2024-08-06 DIAGNOSIS — R73.9 BORDERLINE HYPERGLYCEMIA: ICD-10-CM

## 2024-08-06 PROCEDURE — 3074F SYST BP LT 130 MM HG: CPT | Performed by: INTERNAL MEDICINE

## 2024-08-06 PROCEDURE — 93000 ELECTROCARDIOGRAM COMPLETE: CPT | Performed by: INTERNAL MEDICINE

## 2024-08-06 PROCEDURE — 3008F BODY MASS INDEX DOCD: CPT | Performed by: INTERNAL MEDICINE

## 2024-08-06 PROCEDURE — 99214 OFFICE O/P EST MOD 30 MIN: CPT | Performed by: INTERNAL MEDICINE

## 2024-08-06 PROCEDURE — 3079F DIAST BP 80-89 MM HG: CPT | Performed by: INTERNAL MEDICINE

## 2024-08-06 PROCEDURE — 1036F TOBACCO NON-USER: CPT | Performed by: INTERNAL MEDICINE

## 2024-08-06 RX ORDER — FAMOTIDINE 20 MG/1
TABLET, FILM COATED ORAL
Qty: 180 TABLET | Refills: 1 | Status: SHIPPED | OUTPATIENT
Start: 2024-08-06 | End: 2024-08-07 | Stop reason: WASHOUT

## 2024-08-06 RX ORDER — ROSUVASTATIN CALCIUM 5 MG/1
TABLET, COATED ORAL
Qty: 90 TABLET | Refills: 0 | Status: SHIPPED | OUTPATIENT
Start: 2024-08-06

## 2024-08-06 RX ORDER — ASPIRIN 81 MG/1
TABLET ORAL
Qty: 90 TABLET | Refills: 0 | Status: SHIPPED | OUTPATIENT
Start: 2024-08-06

## 2024-08-06 RX ORDER — NITROGLYCERIN 0.4 MG/1
0.4 TABLET SUBLINGUAL EVERY 5 MIN PRN
Qty: 30 TABLET | Refills: 0 | Status: SHIPPED | OUTPATIENT
Start: 2024-08-06 | End: 2025-08-06

## 2024-08-06 NOTE — PATIENT INSTRUCTIONS
Thank you very much for coming.  I am very happy to see you.    I do understand that you had some left-sided chest wall pain, which you attributed to eating JALAPENOS!  You have had this in the past, and avoiding spicy food has alleviated your discomfort.    In the meantime, unfortunately, your EKG is showing changes that were not present from last year.  You did have a single T wave inversion last year, for which we even did a STRESS TEST August 2023, revealing negative findings.    Today, your EKG has shown more changes that are highly suggestive of poor blood flow to the bottom part of your heart.  You have T wave inversions in leads II, 3, aVF, as well as V4-V6.  I mention this because you are familiar with EKG changes and your line of work!  When you see this, this usually means that the patient will have to go to the ER, because you are seeing them for chest pain!    Indeed, your chest pain is atypical, and is related to food intake.  As such, please avoid spicy food.  Avoid trigger foods like onions, tomatoes, salsa, black coffee, dark chocolate, garlic.    Likewise, stay upright until 4 hours after eating to minimize your symptoms.    Also, please start taking FAMOTIDINE 20 mg before breakfast time, and before suppertime, twice a day, even if you are not going to eat.    This will control chest pain that might be related to heartburn.    In the meantime, we have to manage your overall risk for heart attack or stroke, which can be as high as 13% over the next 10 years.  As such, we will have to put you on CHOLESTEROL medication.  Start taking ROSUVASTATIN 5 mg, lowest dose.  Take this at BEDTIME.    Also, let us add BABY ASPIRIN 81 mg.  Take this with food every day, same time each day.  You will also have NITROGLYCERIN, just in case you have recurrence of chest pain.  Place a tablet under your tongue and melt it for chest pain.  Indeed, if the chest pain goes away with nitroglycerin, this may indicate that  your chest discomfort is indeed related to your heart, and not just heartburn.    You are already on A panel for migraine, which is also good for blood pressure control and decreasing your risk of heart attack or stroke.    In the future, you might need other medications, but for now, you are already on good regimens, propranolol, aspirin, rosuvastatin, and nitroglycerin as needed for pain.    You will benefit from seeing CARDIOLOGY anytime soon.  Dr. An is my man!  Let us see if he can accommodate as soon.    If you ever develop chest pain, please dial 911 or have yourself brought to the ER, but never drive yourself to the ER!    Please let me see you in 2 months, but call or text sooner as needed.    Again, thank you very much for coming.  Thank you for your efforts.  I do know that you are physically active, and you have even been eating well, and you have been losing weight on your own.  Once you are cleared by cardiology, we can talk about their options regarding weight management.  For now, take things easily, and keep an eye on yourself.  See you in 2 months at the latest, but call or text sooner as needed.  Take care and God bless.            0  Return in 2 months.  40 minutes please.  COMPLETE physical examination.  Coordinate with CARDIOLOGY.  Reassess options regarding weight management.  Reassess mood, energy, function, preventive strategies, cardiovascular risk.            0

## 2024-08-06 NOTE — PROGRESS NOTES
Subjective   Patient ID: Figueroa Cabral is a 59 y.o. male who presents for Follow-up (Patient presented today for a 3 week follow up.).    HPI   The patient states that he had left-sided chest pain described as burning, constant, immediately after eating spicy food.  He states that he had some JALAPENO.  He states that he has had this in the past whenever he would eat something spicy.  Symptoms relieved on their own, with no radiation.  No accompanying symptoms, no palpitations, orthopnea, paroxysmal nocturnal dyspnea.  No dizziness, diaphoresis, no history of loss of consciousness.  No bipedal edema.  No particular dyspnea on exertion.  History of hypertension, compliant with medication, including propranolol also taken for migraine, as well as chlorthalidone.  Blood pressure has been controlled in the past.    In the meantime, twelve-lead EKG ordered in preparation for use of PHENTERMINE/ADIPEX.  Previous EKG done July 2023 with T wave inversion lead III, noted, for which patient was subjected to a STRESS TEST August 2023 revealing negative findings.    The patient is a non-smoker, and is physically active.  Aside from above chest discomfort, no apparent other symptoms.  Likewise, no headache, blurred vision, diplopia.  No dysphagia.  Continues to want to stay healthy, independent, and productive, and does not wish harm to self or others.    Has not had any change in bowel or bladder character or habits, no apparent blood in stool.  No dysuria, flank, suprapubic pain.  No skin changes, rashes, pruritus, jaundice.  No easy bruisability.  No particular cough, no particular sputum production.  CONSTITUTIONALLY, no fever, no chills.  No night sweats.  No lingering anorexia or nausea.  No apparent lymphadenopathy.  No apparent weight loss.    Has already lost some weight with diet and exercise alone.  ENDOCRINE with no polyuria, polydipsia, polyphagia.  No blurred vision.  No skin, hair, nail changes.  No dramatic  "weight loss or weight gain.          Review of Systems  Review of systems as in history of present illness, and otherwise, reviewed separately as well, and was unremarkable/negative/noncontributory.        Objective   /83 (BP Location: Left arm, Patient Position: Sitting, BP Cuff Size: Adult)   Pulse 70   Ht 1.727 m (5' 8\")   Wt 83.9 kg (184 lb 14.4 oz)   SpO2 96%   BMI 28.11 kg/m²     Physical Exam  Perhaps minimally anxious, but otherwise, in good spirits.  Not in distress or diaphoresis.  Alert, oriented x 3.  Amiable.  Not unkempt.  Moderately built.  Receptive, cheerful, appropriate, and eager to improve quality of life.  Does not wish harm to self or others.    HEAD pink palpebral conjunctivae, anicteric sclerae.  Mucous membranes moist.  NECK supple, no apparent jugular venous distention.  No carotid bruit.  CARDIOVASCULAR not in distress or diaphoresis.  No bipedal edema.  Regular rate and rhythm.  No murmurs appreciated.  LUNGS not in distress or diaphoresis.  Not using accessory muscles.  Clear to auscultation bilaterally.  ABDOMEN soft, nontender.  BACK no costovertebral angle tenderness.  EXTREMITIES no clubbing, no cyanosis.  SKIN with no jaundice, no particular bruising.  NEURO no facial asymmetry.  No apparent cranial nerve deficits.  Romberg negative.  Ambulating without need of assistance.  No apparent focal weakness.  No tremors.  PSYCH receptive, appropriate, and eager to maintain and improve quality of life.        LABORATORY results reviewed with patient.  Twelve-lead EKG July 2023 T wave inversion lead III only.  Stress test done August 2023 revealing otherwise negative findings.  Recent lipid panel, hemoglobin A1c, liver and kidney function, blood count, all reviewed as well.        Assessment/Plan   Diagnoses and all orders for this visit:  Atypical chest pain  -     Referral to Cardiology; Future  -     nitroglycerin (Nitrostat) 0.4 mg SL tablet; Place 1 tablet (0.4 mg) under the " tongue every 5 minutes if needed for chest pain. May repeat every 5 minutes for up to 3 doses.  -     aspirin 81 mg EC tablet; Please take 1 tablet by mouth with food every day, same time each day please.  Thank you.  -     Follow Up In Primary Care - [facility]; Future  -     ECG 12 lead (Clinic Performed)  Abnormal EKG  Comments:  T INV II, II, F, V4-V6 08/2024  Orders:  -     Referral to Cardiology; Future  -     aspirin 81 mg EC tablet; Please take 1 tablet by mouth with food every day, same time each day please.  Thank you.  -     Follow Up In Primary Care - [facility]; Future  Gastroesophageal reflux disease, unspecified whether esophagitis present  -     famotidine (Pepcid) 20 mg tablet; Please take 1 tablet by mouth before breakfast time, and 1 tablet by mouth before suppertime.  Please take twice a day even if you are not going to eat.  Very important, very helpful.  Lots of fluids throughout the day.  Thank you.  -     Follow Up In Primary Care - [facility]; Future  [place] cardiac risk 10-20% in next 10 years  Comments:  12.6% 07/2024  Orders:  -     rosuvastatin (Crestor) 5 mg tablet; Please take 1 tablet by mouth at bedtime.  Thank you.  -     Referral to Cardiology; Future  -     nitroglycerin (Nitrostat) 0.4 mg SL tablet; Place 1 tablet (0.4 mg) under the tongue every 5 minutes if needed for chest pain. May repeat every 5 minutes for up to 3 doses.  -     aspirin 81 mg EC tablet; Please take 1 tablet by mouth with food every day, same time each day please.  Thank you.  -     Follow Up In Primary Care - [facility]; Future  -     ECG 12 lead (Clinic Performed)  Essential hypertension  -     Referral to Cardiology; Future  -     aspirin 81 mg EC tablet; Please take 1 tablet by mouth with food every day, same time each day please.  Thank you.  -     Follow Up In Primary Care - [facility]; Future  -     ECG 12 lead (Clinic Performed)  Mixed hyperlipidemia  -     rosuvastatin (Crestor) 5 mg tablet;  Please take 1 tablet by mouth at bedtime.  Thank you.  -     Referral to Cardiology; Future  -     aspirin 81 mg EC tablet; Please take 1 tablet by mouth with food every day, same time each day please.  Thank you.  -     Follow Up In Primary Care - Established; Future  -     ECG 12 lead (Clinic Performed)  Borderline hyperglycemia  -     Referral to Cardiology; Future  -     aspirin 81 mg EC tablet; Please take 1 tablet by mouth with food every day, same time each day please.  Thank you.  -     Follow Up In Primary Care - Established; Future  -     ECG 12 lead (Clinic Performed)  Overweight (BMI 25.0-29.9)  -     Follow Up In Primary Care - Established  -     Follow Up In Primary Care - Established; Future  -     ECG 12 lead (Clinic Performed)       Thank you very much for coming.  I am very happy to see you.    I do understand that you had some left-sided chest wall pain, which you attributed to eating JALAPENOS!  You have had this in the past, and avoiding spicy food has alleviated your discomfort.    In the meantime, unfortunately, your EKG is showing changes that were not present from last year.  You did have a single T wave inversion last year, for which we even did a STRESS TEST August 2023, revealing negative findings.    Today, your EKG has shown more changes that are highly suggestive of poor blood flow to the bottom part of your heart.  You have T wave inversions in leads II, 3, aVF, as well as V4-V6.  I mention this because you are familiar with EKG changes and your line of work!  When you see this, this usually means that the patient will have to go to the ER, because you are seeing them for chest pain!    Indeed, your chest pain is atypical, and is related to food intake.  As such, please avoid spicy food.  Avoid trigger foods like onions, tomatoes, salsa, black coffee, dark chocolate, garlic.    Likewise, stay upright until 4 hours after eating to minimize your symptoms.    Also, please start taking  FAMOTIDINE 20 mg before breakfast time, and before suppertime, twice a day, even if you are not going to eat.    This will control chest pain that might be related to heartburn.    In the meantime, we have to manage your overall risk for heart attack or stroke, which can be as high as 13% over the next 10 years.  As such, we will have to put you on CHOLESTEROL medication.  Start taking ROSUVASTATIN 5 mg, lowest dose.  Take this at BEDTIME.    Also, let us add BABY ASPIRIN 81 mg.  Take this with food every day, same time each day.  You will also have NITROGLYCERIN, just in case you have recurrence of chest pain.  Place a tablet under your tongue and melt it for chest pain.  Indeed, if the chest pain goes away with nitroglycerin, this may indicate that your chest discomfort is indeed related to your heart, and not just heartburn.    You are already on A panel for migraine, which is also good for blood pressure control and decreasing your risk of heart attack or stroke.    In the future, you might need other medications, but for now, you are already on good regimens, propranolol, aspirin, rosuvastatin, and nitroglycerin as needed for pain.    You will benefit from seeing CARDIOLOGY anytime soon.  Dr. An is my man!  Let us see if he can accommodate as soon.    If you ever develop chest pain, please dial 911 or have yourself brought to the ER, but never drive yourself to the ER!    Please let me see you in 2 months, but call or text sooner as needed.    Again, thank you very much for coming.  Thank you for your efforts.  I do know that you are physically active, and you have even been eating well, and you have been losing weight on your own.  Once you are cleared by cardiology, we can talk about their options regarding weight management.  For now, take things easily, and keep an eye on yourself.  See you in 2 months at the latest, but call or text sooner as needed.  Take care and God bless.            0  Return in 2  months.  40 minutes please.  COMPLETE physical examination.  Coordinate with CARDIOLOGY.  Reassess options regarding weight management.  Reassess mood, energy, function, preventive strategies, cardiovascular risk.            0

## 2024-08-07 ENCOUNTER — OFFICE VISIT (OUTPATIENT)
Dept: CARDIOLOGY | Facility: CLINIC | Age: 59
End: 2024-08-07
Payer: COMMERCIAL

## 2024-08-07 VITALS
SYSTOLIC BLOOD PRESSURE: 129 MMHG | HEIGHT: 68 IN | HEART RATE: 62 BPM | DIASTOLIC BLOOD PRESSURE: 86 MMHG | BODY MASS INDEX: 27.58 KG/M2 | WEIGHT: 182 LBS

## 2024-08-07 DIAGNOSIS — R07.89 ATYPICAL CHEST PAIN: ICD-10-CM

## 2024-08-07 DIAGNOSIS — K21.9 GASTROESOPHAGEAL REFLUX DISEASE WITHOUT ESOPHAGITIS: Primary | ICD-10-CM

## 2024-08-07 DIAGNOSIS — R73.9 BORDERLINE HYPERGLYCEMIA: ICD-10-CM

## 2024-08-07 DIAGNOSIS — Z91.89 FRAMINGHAM CARDIAC RISK 10-20% IN NEXT 10 YEARS: Chronic | ICD-10-CM

## 2024-08-07 DIAGNOSIS — E78.2 MIXED HYPERLIPIDEMIA: ICD-10-CM

## 2024-08-07 DIAGNOSIS — R94.31 ABNORMAL EKG: Chronic | ICD-10-CM

## 2024-08-07 DIAGNOSIS — I10 ESSENTIAL HYPERTENSION: ICD-10-CM

## 2024-08-07 DIAGNOSIS — R07.89 OTHER CHEST PAIN: ICD-10-CM

## 2024-08-07 PROCEDURE — 93000 ELECTROCARDIOGRAM COMPLETE: CPT | Performed by: INTERNAL MEDICINE

## 2024-08-07 PROCEDURE — 3074F SYST BP LT 130 MM HG: CPT | Performed by: INTERNAL MEDICINE

## 2024-08-07 PROCEDURE — 3008F BODY MASS INDEX DOCD: CPT | Performed by: INTERNAL MEDICINE

## 2024-08-07 PROCEDURE — 99203 OFFICE O/P NEW LOW 30 MIN: CPT | Performed by: INTERNAL MEDICINE

## 2024-08-07 PROCEDURE — 1036F TOBACCO NON-USER: CPT | Performed by: INTERNAL MEDICINE

## 2024-08-07 PROCEDURE — 3079F DIAST BP 80-89 MM HG: CPT | Performed by: INTERNAL MEDICINE

## 2024-08-07 NOTE — PROGRESS NOTES
CARDIOLOGY NEW PATIENT OFFICE VISIT    Patient:  Figueroa Cabral  YOB: 1965  MRN: [id number]       Chief Complaint:      Chest pain      History of Present Illness:     Figueroa Cabral is a 59 y.o. male who is being seen today at the request of Dr. Supa Montero for evaluation of cardiac status in the setting of chest discomfort.  He does not have any history of atherosclerotic heart or valvular heart disease.  He has a history of hypertension and gastroesophageal reflux disease.  He is maintained on propranolol and chlorthalidone.  He takes Mylanta on a regular basis and was just recently started on Pepcid.  He has hyperlipidemia for which he takes rosuvastatin 5 mg daily.  No history of diabetes mellitus.  He quit smoking cigarettes in 1996.  He has a history of stable lung nodules.    At the time of his visit with Dr. Montero yesterday he reported an episode of some left-sided chest pain.  This initially occurred after he had eaten some spicy pno beef jerky.  He described a burning discomfort in his lower chest that lasted 2 to 3 minutes.  The symptoms resolved on their own.  He has had similar discomfort in the past after eating spicy food.  He denies any associated symptoms.  He has not had any recurrent symptoms.  He works at [employer] and states his job is very active.  He generally walks about 15,000 steps per day.  He was at work earlier today and did not note any recurrent symptoms with exertion.  Overall his pattern of chest discomfort is very atypical for angina pectoris.  Prior EKG July 2023 showed normal sinus rhythm with nonspecific inferior ST-T wave changes.  EKG in the office today shows similar findings.  An exercise myocardial perfusion study August 16, 2023 was negative for ischemia or infarction.  Calculated LV ejection fraction 63%.  He achieved 89% of MPHR at an energy level of 10.7 METS.  No exercise-induced ST changes.  [place] cardiac risk was calculated at 12.6%.   Patient is hoping to be initiated on Adipex.  He does states that he and his wife recently changed her diet and he has lost approximately 10 pounds.    Lab studies dated July 16, 2024 showed a normal CBC and CMP with exception of glucose 105.  TSH and magnesium levels were normal.  Cholesterol 213 with HDL 43, , and triglycerides 281.  Hemoglobin A1c 5.4.  Vitamin D level 25.  CPK was normal.    I spoke at length with Ethan regarding his symptoms of chest discomfort.  As noted the discomfort is atypical for angina pectoris.  Total duration was less than 5 minutes.  He has not had any exertional related chest discomfort.  His baseline EKG does show some nonspecific inferolateral ST changes.  These were noted on tracing in July 2023 and subsequent exercise myocardial perfusion study in August 2023 was negative for ischemia.  His blood pressure is well-controlled.  He is now taking a statin.  No diabetes mellitus.  He no longer smokes.  No additional cardiac testing indicated at this time.  Other details noted below.    The above portion of this note was dictated by me using voice recognition software.  I personally performed the services described in the documentation.  The scribe entering the documentation below was in my presence.  I affirm that the information is both accurate and complete.      Allergies:     No Known Allergies       Outpatient Medications:     Current Outpatient Medications   Medication Instructions    ascorbic acid (VITAMIN C) 500 mg, oral, Daily    aspirin 81 mg EC tablet Please take 1 tablet by mouth with food every day, same time each day please.  Thank you.    chlorthalidone (HYGROTON) 25 mg, oral, Daily with breakfast    cholecalciferol (VITAMIN D-3) 50 mcg, oral, Daily    cyanocobalamin (Vitamin B-12) 1,000 mcg tablet PLEASE TAKE 1 TABLET BY MOUTH WITH LUNCH. THANK YOU.    famotidine (Pepcid) 20 mg tablet Please take 1 tablet by mouth before breakfast time, and 1 tablet by mouth  before suppertime.  Please take twice a day even if you are not going to eat.  Very important, very helpful.  Lots of fluids throughout the day.  Thank you.    nitroglycerin (NITROSTAT) 0.4 mg, sublingual, Every 5 min PRN, May repeat every 5 minutes for up to 3 doses.    potassium chloride CR 10 mEq ER tablet Please take 2 tablets by mouth with breakfast and a glass of water.  Stay upright 30 minutes.  Thank you.    propranolol LA (INDERAL LA) 120 mg, oral, Daily    rosuvastatin (Crestor) 5 mg tablet Please take 1 tablet by mouth at bedtime.  Thank you.        Past Medical History:     Past Medical History:   Diagnosis Date    Migraine, unspecified, not intractable, without status migrainosus     Migraines    Pain in unspecified knee     Knee pain    Personal history of other diseases of the circulatory system     History of hypertension    Personal history of other diseases of the digestive system     History of gastroesophageal reflux (GERD)    Personal history of other specified conditions     History of multiple pulmonary nodules       Social History:     Social History     Tobacco Use    Smoking status: Former     Current packs/day: 0.00     Types: Cigarettes     Quit date:      Years since quittin.    Smokeless tobacco: Never   Vaping Use    Vaping status: Never Used   Substance Use Topics    Alcohol use: Yes    Drug use: Never       Family History:   No family history on file.      Review of Systems:     12 point review of systems completed and is negative other than that detailed above.       Objective:     Vitals:    24 1348   BP: 129/86   Pulse: 62       Wt Readings from Last 4 Encounters:   24 83.9 kg (184 lb 14.4 oz)   24 87 kg (191 lb 14.4 oz)   23 81.5 kg (179 lb 10.8 oz)   23 83.9 kg (185 lb)       Physical Examination:     GENERAL:  Well developed, well nourished, in no acute distress.  CHEST:  Symmetric and nontender.  NEURO/PSYCH:  Alert and oriented times  three with approppriate behavior and responses.  NECK:  Supple, no JVD, no bruit.  LUNGS:  Clear to auscultation bilaterally, normal respiratory effort.  HEART:  Rate and rhythm regular with no evident murmur, no gallop appreciated.        There are no rubs, clicks or heaves.  EXTREMITIES:  Warm with good color, no clubbing or cyanosis.  There is no edema noted.  PERIPHERAL VASCULAR:  Pulses present and equally palpable; 2+ throughout.      Lab:     CBC:   Lab Results   Component Value Date    WBC 6.1 07/16/2024    RBC 4.94 07/16/2024    HGB 14.9 07/16/2024    HCT 44.8 07/16/2024     07/16/2024        CMP:    Lab Results   Component Value Date     07/16/2024    K 3.6 07/16/2024     07/16/2024    CO2 32 07/16/2024    BUN 19 07/16/2024    CREATININE 0.90 07/16/2024    GLUCOSE 105 (H) 07/16/2024    CALCIUM 9.3 07/16/2024       Lipid Profile:    Lab Results   Component Value Date    TRIG 281 (H) 07/16/2024    HDL 43.4 07/16/2024    LDLCALC 113 (H) 07/16/2024       BMP:  Lab Results   Component Value Date     07/16/2024     09/15/2023     02/04/2023     04/01/2022    K 3.6 07/16/2024    K 3.5 09/15/2023    K 3.5 02/04/2023    K 3.4 (L) 04/01/2022     07/16/2024    CL 99 09/15/2023    CL 99 02/04/2023     04/01/2022    CO2 32 07/16/2024    CO2 30 09/15/2023    CO2 36 (H) 02/04/2023    CO2 34 (H) 04/01/2022    BUN 19 07/16/2024    BUN 19 09/15/2023    BUN 19 02/04/2023    BUN 17 04/01/2022    CREATININE 0.90 07/16/2024    CREATININE 0.88 09/15/2023    CREATININE 0.81 02/04/2023    CREATININE 0.90 04/01/2022       CBC:  Lab Results   Component Value Date    WBC 6.1 07/16/2024    WBC 5.6 09/15/2023    WBC 6.5 02/04/2023    WBC 7.2 08/16/2021    RBC 4.94 07/16/2024    RBC 5.26 09/15/2023    RBC 5.17 02/04/2023    RBC 5.16 08/16/2021    HGB 14.9 07/16/2024    HGB 15.6 09/15/2023    HGB 15.0 02/04/2023    HGB 15.1 08/16/2021    HCT 44.8 07/16/2024    HCT 46.3 09/15/2023     HCT 46.7 02/04/2023    HCT 45.9 08/16/2021    MCV 91 07/16/2024    MCV 88 09/15/2023    MCV 90 02/04/2023    MCV 89 08/16/2021    MCH 30.2 07/16/2024    MCHC 33.3 07/16/2024    MCHC 33.7 09/15/2023    MCHC 32.1 02/04/2023    MCHC 32.9 08/16/2021    RDW 12.8 07/16/2024    RDW 12.4 09/15/2023    RDW 14.5 02/04/2023    RDW 12.7 08/16/2021     07/16/2024     09/15/2023     02/04/2023     08/16/2021       Hepatic Function Panel:    Lab Results   Component Value Date    ALKPHOS 54 07/16/2024    ALT 19 07/16/2024    AST 16 07/16/2024    PROT 6.6 07/16/2024    BILITOT 0.6 07/16/2024       Magnesium:    Lab Results   Component Value Date    MG 1.82 07/16/2024       TSH:    Lab Results   Component Value Date    TSH 1.43 07/16/2024       PT/INR:    Lab Results   Component Value Date    PROTIME 11.1 08/16/2021    INR 1.0 08/16/2021       HgBA1c:    Lab Results   Component Value Date    HGBA1C 5.4 07/16/2024       Problem List:     Patient Active Problem List   Diagnosis    Erectile dysfunction    GERD (gastroesophageal reflux disease)    Hepatic steatosis    Migraine without aura and without status migrainosus, not intractable    Lung nodule, multiple    Serrated polyp of colon    Snoring    Ureteral calculus    Essential hypertension    Mixed hyperlipidemia    Borderline hyperglycemia    [place] cardiac risk 10-20% in next 10 years    Overweight (BMI 25.0-29.9)    Vitamin D deficiency    Vitamin B12 deficiency    Hypokalemia       Assessment:     Problem List Items Addressed This Visit             ICD-10-CM    GERD (gastroesophageal reflux disease) - Primary K21.9    Essential hypertension I10    Relevant Orders    ECG 12 lead (Clinic Performed) (Completed)    Mixed hyperlipidemia E78.2    Relevant Orders    ECG 12 lead (Clinic Performed) (Completed)    Borderline hyperglycemia R73.9    Relevant Orders    ECG 12 lead (Clinic Performed) (Completed)    [place] cardiac risk 10-20% in next 10  years Z91.89    Relevant Orders    ECG 12 lead (Clinic Performed) (Completed)    Other chest pain R07.89     Other Visit Diagnoses         Codes    Atypical chest pain     R07.89    Relevant Orders    ECG 12 lead (Clinic Performed) (Completed)    Follow Up In Cardiology    Abnormal EKG  (Chronic)    R94.31    T INV II, II, F, V4-V6 08/2024     Relevant Orders    ECG 12 lead (Clinic Performed) (Completed)            Plan:     -He will be continued on his same medications.     -He was advised to restrict sodium with an aim of no more than 2 grams per day.     -He was advised on the need for regular exercise.    -He was advised on the need to follow a Mediterranean style diet.    -He was advised to seek medical attention for any new or worsening symptoms.    -He will follow-up on as-needed basis.

## 2024-08-12 DIAGNOSIS — E87.6 HYPOKALEMIA: ICD-10-CM

## 2024-08-12 DIAGNOSIS — I10 ESSENTIAL HYPERTENSION: ICD-10-CM

## 2024-08-14 RX ORDER — POTASSIUM CHLORIDE 750 MG/1
TABLET, FILM COATED, EXTENDED RELEASE ORAL
Qty: 180 TABLET | Refills: 0 | Status: SHIPPED | OUTPATIENT
Start: 2024-08-14

## 2024-08-16 ENCOUNTER — OFFICE VISIT (OUTPATIENT)
Dept: ORTHOPEDIC SURGERY | Facility: CLINIC | Age: 59
End: 2024-08-16
Payer: COMMERCIAL

## 2024-08-16 ENCOUNTER — HOSPITAL ENCOUNTER (OUTPATIENT)
Dept: RADIOLOGY | Facility: CLINIC | Age: 59
Discharge: HOME | End: 2024-08-16
Payer: COMMERCIAL

## 2024-08-16 DIAGNOSIS — M25.562 LEFT KNEE PAIN, UNSPECIFIED CHRONICITY: ICD-10-CM

## 2024-08-16 DIAGNOSIS — M70.42 PREPATELLAR BURSITIS, LEFT KNEE: ICD-10-CM

## 2024-08-16 DIAGNOSIS — M25.562 LEFT KNEE PAIN, UNSPECIFIED CHRONICITY: Primary | ICD-10-CM

## 2024-08-16 PROCEDURE — 73564 X-RAY EXAM KNEE 4 OR MORE: CPT | Mod: LT

## 2024-08-16 PROCEDURE — 99213 OFFICE O/P EST LOW 20 MIN: CPT | Performed by: STUDENT IN AN ORGANIZED HEALTH CARE EDUCATION/TRAINING PROGRAM

## 2024-08-16 RX ORDER — NAPROXEN 500 MG/1
500 TABLET ORAL
Qty: 28 TABLET | Refills: 0 | Status: SHIPPED | OUTPATIENT
Start: 2024-08-16 | End: 2024-08-30

## 2024-08-16 NOTE — PROGRESS NOTES
Acute Injury Established Patient Visit    HPI: Figueroa is a 59 y.o.male who presents today with new complaints of left knee swelling.  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  He noted there was some swelling over the anterior knee, but this got better after he got off his knees.  He denies any pop, click, locking, and denies any significant pain.    Plan: For this prepatellar bursitis, we will place him on an anti-inflammatory, provide compression, and have him watch his symptoms closely.  He can follow-up as needed.    Assessment:   Problem List Items Addressed This Visit    None  Visit Diagnoses       Left knee pain, unspecified chronicity    -  Primary    Relevant Medications    naproxen (Naprosyn) 500 mg tablet    Other Relevant Orders    XR knee left 4+ views (Completed)    Prepatellar bursitis, left knee                Diagnostics: Reviewed  XR knee left 4+ views          Interpreted By:  Mathew Hammer,   STUDY:  XR KNEE LEFT 4+ VIEWS; ;  8/16/2024 4:11 pm      INDICATION:  Signs/Symptoms:pain.      COMPARISON:  None.      ACCESSION NUMBER(S):  [id number]      ORDERING CLINICIAN:  MATHEW HAMMER      FINDINGS:  X-rays of the left knee reveal no acute fracture or dislocation. No  significant degenerative changes noted.      IMPRESSION:  As above.          MACRO:  None      Signed by: Mathew Hammer 8/16/2024 4:17 PM  Dictation workstation:   [id number]           Procedure:  Procedures    Physical Exam:  GENERAL:  No obvious acute distress.  NEURO:  Distally neurovascularly intact.  Sensation intact to light touch.  Extremity: Left knee examination shows:  Skin is intact;  No erythema or warmth;  Mild amount of swelling over the anterior knee with no significant tenderness;  No edema or ecchymosis;  No effusion;  Can flex the left knee to 130 degrees;  Full extension at 0 degrees;  No pain over the patella;  Negative patellar grind test;  No pain over the medial joint  line;  No pain over the lateral joint line;  No pain over the patellar or quadricep tendon;  No pain over the proximal tibia;  No pain over the popliteal fossa;  Negative valgus stress test;  Negative varus stress test;  Negative Violeta's test medially with no instability;  Negative Violeta's test laterally with no instability;  Negative Lachman's test;  Patellar and quadricep mechanism intact;  Negative anterior and posterior drawer test;  Negative patellar apprehension test;  Distal pulses are palpable;  Neurovascularly intact; and  Walking with no significant antalgic gait.    Orders Placed This Encounter    XR knee left 4+ views    naproxen (Naprosyn) 500 mg tablet      At the conclusion of the visit there were no further questions by the patient/family regarding their plan of care.  Patient was instructed to call or return with any issues, questions, or concerns regarding their injury and/or treatment plan described above.     08/16/24 at 4:25 PM - Mathew Hammer DO    Office: [phone]    This note was prepared using voice recognition software.  The details of this note are correct and have been reviewed, and corrected to the best of my ability.  Some grammatical errors may persist related to the Dragon software.

## 2024-09-11 ENCOUNTER — APPOINTMENT (OUTPATIENT)
Dept: RADIOLOGY | Facility: HOSPITAL | Age: 59
End: 2024-09-11
Payer: COMMERCIAL

## 2024-09-11 ENCOUNTER — HOSPITAL ENCOUNTER (OUTPATIENT)
Facility: HOSPITAL | Age: 59
Setting detail: OBSERVATION
Discharge: HOME | End: 2024-09-12
Attending: STUDENT IN AN ORGANIZED HEALTH CARE EDUCATION/TRAINING PROGRAM | Admitting: SURGERY
Payer: COMMERCIAL

## 2024-09-11 DIAGNOSIS — S30.0XXA TRAUMATIC HEMATOMA OF BUTTOCK, INITIAL ENCOUNTER: ICD-10-CM

## 2024-09-11 DIAGNOSIS — K86.9 PANCREATIC LESION (HHS-HCC): ICD-10-CM
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
## 2024-09-11 LAB
ABO GROUP (TYPE) IN BLOOD: NORMAL
ALBUMIN SERPL BCP-MCNC: 4.2 G/DL (ref 3.4–5)
ALP SERPL-CCNC: 50 U/L (ref 33–120)
ALT SERPL W P-5'-P-CCNC: 19 U/L (ref 10–52)
ANION GAP SERPL CALC-SCNC: 10 MMOL/L (ref 10–20)
ANTIBODY SCREEN: NORMAL
AST SERPL W P-5'-P-CCNC: 17 U/L (ref 9–39)
BASOPHILS # BLD AUTO: 0.11 X10*3/UL (ref 0–0.1)
BASOPHILS NFR BLD AUTO: 1.3 %
BILIRUB SERPL-MCNC: 0.5 MG/DL (ref 0–1.2)
BUN SERPL-MCNC: 25 MG/DL (ref 6–23)
CALCIUM SERPL-MCNC: 9.5 MG/DL (ref 8.6–10.3)
CHLORIDE SERPL-SCNC: 103 MMOL/L (ref 98–107)
CO2 SERPL-SCNC: 31 MMOL/L (ref 21–32)
CREAT SERPL-MCNC: 0.97 MG/DL (ref 0.5–1.3)
EGFRCR SERPLBLD CKD-EPI 2021: >90 ML/MIN/1.73M*2
EOSINOPHIL # BLD AUTO: 0.54 X10*3/UL (ref 0–0.7)
EOSINOPHIL NFR BLD AUTO: 6.6 %
ERYTHROCYTE [DISTWIDTH] IN BLOOD BY AUTOMATED COUNT: 12.6 % (ref 11.5–14.5)
ETHANOL SERPL-MCNC: <10 MG/DL
GLUCOSE SERPL-MCNC: 97 MG/DL (ref 74–99)
HCT VFR BLD AUTO: 41.7 % (ref 41–52)
HGB BLD-MCNC: 14 G/DL (ref 13.5–17.5)
HOLD SPECIMEN: NORMAL
IMM GRANULOCYTES # BLD AUTO: 0.09 X10*3/UL (ref 0–0.7)
IMM GRANULOCYTES NFR BLD AUTO: 1.1 % (ref 0–0.9)
INR PPP: 1 (ref 0.9–1.1)
LACTATE SERPL-SCNC: 0.9 MMOL/L (ref 0.4–2)
LYMPHOCYTES # BLD AUTO: 1.96 X10*3/UL (ref 1.2–4.8)
LYMPHOCYTES NFR BLD AUTO: 24 %
MCH RBC QN AUTO: 29.9 PG (ref 26–34)
MCHC RBC AUTO-ENTMCNC: 33.6 G/DL (ref 32–36)
MCV RBC AUTO: 89 FL (ref 80–100)
MONOCYTES # BLD AUTO: 0.97 X10*3/UL (ref 0.1–1)
MONOCYTES NFR BLD AUTO: 11.9 %
NEUTROPHILS # BLD AUTO: 4.51 X10*3/UL (ref 1.2–7.7)
NEUTROPHILS NFR BLD AUTO: 55.1 %
NRBC BLD-RTO: 0 /100 WBCS (ref 0–0)
PLATELET # BLD AUTO: 235 X10*3/UL (ref 150–450)
POTASSIUM SERPL-SCNC: 3.4 MMOL/L (ref 3.5–5.3)
PROT SERPL-MCNC: 6.3 G/DL (ref 6.4–8.2)
PROTHROMBIN TIME: 11.2 SECONDS (ref 9.8–12.8)
RBC # BLD AUTO: 4.68 X10*6/UL (ref 4.5–5.9)
RH FACTOR (ANTIGEN D): NORMAL
SODIUM SERPL-SCNC: 141 MMOL/L (ref 136–145)
WBC # BLD AUTO: 8.2 X10*3/UL (ref 4.4–11.3)

## 2024-09-11 PROCEDURE — G0378 HOSPITAL OBSERVATION PER HR: HCPCS

## 2024-09-11 PROCEDURE — 71260 CT THORAX DX C+: CPT | Performed by: RADIOLOGY

## 2024-09-11 PROCEDURE — 86901 BLOOD TYPING SEROLOGIC RH(D): CPT | Performed by: STUDENT IN AN ORGANIZED HEALTH CARE EDUCATION/TRAINING PROGRAM

## 2024-09-11 PROCEDURE — 72125 CT NECK SPINE W/O DYE: CPT

## 2024-09-11 PROCEDURE — 1100000001 HC PRIVATE ROOM DAILY

## 2024-09-11 PROCEDURE — 72128 CT CHEST SPINE W/O DYE: CPT | Mod: RCN

## 2024-09-11 PROCEDURE — 71260 CT THORAX DX C+: CPT

## 2024-09-11 PROCEDURE — 96374 THER/PROPH/DIAG INJ IV PUSH: CPT

## 2024-09-11 PROCEDURE — 2500000004 HC RX 250 GENERAL PHARMACY W/ HCPCS (ALT 636 FOR OP/ED): Performed by: STUDENT IN AN ORGANIZED HEALTH CARE EDUCATION/TRAINING PROGRAM

## 2024-09-11 PROCEDURE — 72131 CT LUMBAR SPINE W/O DYE: CPT | Mod: RCN | Performed by: RADIOLOGY

## 2024-09-11 PROCEDURE — 71045 X-RAY EXAM CHEST 1 VIEW: CPT

## 2024-09-11 PROCEDURE — 71045 X-RAY EXAM CHEST 1 VIEW: CPT | Performed by: RADIOLOGY

## 2024-09-11 PROCEDURE — 85610 PROTHROMBIN TIME: CPT | Performed by: STUDENT IN AN ORGANIZED HEALTH CARE EDUCATION/TRAINING PROGRAM

## 2024-09-11 PROCEDURE — 72170 X-RAY EXAM OF PELVIS: CPT | Performed by: RADIOLOGY

## 2024-09-11 PROCEDURE — G0390 TRAUMA RESPONS W/HOSP CRITI: HCPCS

## 2024-09-11 PROCEDURE — 36415 COLL VENOUS BLD VENIPUNCTURE: CPT | Performed by: STUDENT IN AN ORGANIZED HEALTH CARE EDUCATION/TRAINING PROGRAM

## 2024-09-11 PROCEDURE — 74177 CT ABD & PELVIS W/CONTRAST: CPT | Performed by: RADIOLOGY

## 2024-09-11 PROCEDURE — 2500000002 HC RX 250 W HCPCS SELF ADMINISTERED DRUGS (ALT 637 FOR MEDICARE OP, ALT 636 FOR OP/ED)

## 2024-09-11 PROCEDURE — 80053 COMPREHEN METABOLIC PANEL: CPT | Performed by: STUDENT IN AN ORGANIZED HEALTH CARE EDUCATION/TRAINING PROGRAM

## 2024-09-11 PROCEDURE — 83605 ASSAY OF LACTIC ACID: CPT | Performed by: STUDENT IN AN ORGANIZED HEALTH CARE EDUCATION/TRAINING PROGRAM

## 2024-09-11 PROCEDURE — 72125 CT NECK SPINE W/O DYE: CPT | Performed by: RADIOLOGY

## 2024-09-11 PROCEDURE — 82077 ASSAY SPEC XCP UR&BREATH IA: CPT | Performed by: STUDENT IN AN ORGANIZED HEALTH CARE EDUCATION/TRAINING PROGRAM

## 2024-09-11 PROCEDURE — 85025 COMPLETE CBC W/AUTO DIFF WBC: CPT | Performed by: STUDENT IN AN ORGANIZED HEALTH CARE EDUCATION/TRAINING PROGRAM

## 2024-09-11 PROCEDURE — 96375 TX/PRO/DX INJ NEW DRUG ADDON: CPT | Mod: 59

## 2024-09-11 PROCEDURE — 70450 CT HEAD/BRAIN W/O DYE: CPT | Performed by: RADIOLOGY

## 2024-09-11 PROCEDURE — 70450 CT HEAD/BRAIN W/O DYE: CPT

## 2024-09-11 PROCEDURE — 72128 CT CHEST SPINE W/O DYE: CPT | Mod: RCN | Performed by: RADIOLOGY

## 2024-09-11 PROCEDURE — 72131 CT LUMBAR SPINE W/O DYE: CPT | Mod: RCN

## 2024-09-11 PROCEDURE — 2550000001 HC RX 255 CONTRASTS: Performed by: STUDENT IN AN ORGANIZED HEALTH CARE EDUCATION/TRAINING PROGRAM

## 2024-09-11 PROCEDURE — 99291 CRITICAL CARE FIRST HOUR: CPT | Mod: 25 | Performed by: STUDENT IN AN ORGANIZED HEALTH CARE EDUCATION/TRAINING PROGRAM

## 2024-09-11 PROCEDURE — 99221 1ST HOSP IP/OBS SF/LOW 40: CPT

## 2024-09-11 PROCEDURE — 72170 X-RAY EXAM OF PELVIS: CPT

## 2024-09-11 PROCEDURE — 96374 THER/PROPH/DIAG INJ IV PUSH: CPT | Mod: 59

## 2024-09-11 PROCEDURE — 2500000004 HC RX 250 GENERAL PHARMACY W/ HCPCS (ALT 636 FOR OP/ED): Performed by: SURGERY

## 2024-09-11 RX ORDER — FAMOTIDINE 20 MG/1
20 TABLET, FILM COATED ORAL 2 TIMES DAILY
Status: DISCONTINUED | OUTPATIENT
Start: 2024-09-12 | End: 2024-09-12 | Stop reason: HOSPADM

## 2024-09-11 RX ORDER — ACETAMINOPHEN 325 MG/1
650 TABLET ORAL EVERY 6 HOURS PRN
Status: DISCONTINUED | OUTPATIENT
Start: 2024-09-11 | End: 2024-09-12 | Stop reason: HOSPADM

## 2024-09-11 RX ORDER — TADALAFIL 10 MG/1
10 TABLET ORAL DAILY PRN
COMMUNITY

## 2024-09-11 RX ORDER — FAMOTIDINE 20 MG/1
1 TABLET, FILM COATED ORAL DAILY
COMMUNITY

## 2024-09-11 RX ORDER — ROSUVASTATIN CALCIUM 10 MG/1
5 TABLET, COATED ORAL NIGHTLY
Status: DISCONTINUED | OUTPATIENT
Start: 2024-09-11 | End: 2024-09-12 | Stop reason: HOSPADM

## 2024-09-11 RX ORDER — ASCORBIC ACID 250 MG
500 TABLET ORAL DAILY
Status: DISCONTINUED | OUTPATIENT
Start: 2024-09-12 | End: 2024-09-12 | Stop reason: HOSPADM

## 2024-09-11 RX ORDER — KETOROLAC TROMETHAMINE 30 MG/ML
30 INJECTION, SOLUTION INTRAMUSCULAR; INTRAVENOUS EVERY 6 HOURS PRN
Status: DISCONTINUED | OUTPATIENT
Start: 2024-09-11 | End: 2024-09-12 | Stop reason: HOSPADM

## 2024-09-11 RX ORDER — OXYCODONE AND ACETAMINOPHEN 5; 325 MG/1; MG/1
2 TABLET ORAL EVERY 6 HOURS PRN
Status: DISCONTINUED | OUTPATIENT
Start: 2024-09-11 | End: 2024-09-12 | Stop reason: HOSPADM

## 2024-09-11 RX ORDER — ASPIRIN 81 MG/1
81 TABLET ORAL DAILY
Status: DISCONTINUED | OUTPATIENT
Start: 2024-09-12 | End: 2024-09-12 | Stop reason: HOSPADM

## 2024-09-11 RX ORDER — MORPHINE SULFATE 4 MG/ML
4 INJECTION, SOLUTION INTRAMUSCULAR; INTRAVENOUS ONCE
Status: COMPLETED | OUTPATIENT
Start: 2024-09-11 | End: 2024-09-11

## 2024-09-11 RX ORDER — UBIDECARENONE 75 MG
1000 CAPSULE ORAL DAILY
Status: DISCONTINUED | OUTPATIENT
Start: 2024-09-12 | End: 2024-09-12 | Stop reason: HOSPADM

## 2024-09-11 RX ORDER — POTASSIUM CHLORIDE 20 MEQ/1
20 TABLET, EXTENDED RELEASE ORAL DAILY
Status: DISCONTINUED | OUTPATIENT
Start: 2024-09-12 | End: 2024-09-12 | Stop reason: HOSPADM

## 2024-09-11 RX ORDER — ONDANSETRON HYDROCHLORIDE 2 MG/ML
4 INJECTION, SOLUTION INTRAVENOUS EVERY 6 HOURS PRN
Status: DISCONTINUED | OUTPATIENT
Start: 2024-09-11 | End: 2024-09-12 | Stop reason: HOSPADM

## 2024-09-11 RX ORDER — PROPRANOLOL HYDROCHLORIDE 60 MG/1
120 CAPSULE, EXTENDED RELEASE ORAL DAILY
Status: DISCONTINUED | OUTPATIENT
Start: 2024-09-12 | End: 2024-09-12 | Stop reason: HOSPADM

## 2024-09-11 RX ORDER — CHLORTHALIDONE 25 MG/1
25 TABLET ORAL
Status: DISCONTINUED | OUTPATIENT
Start: 2024-09-12 | End: 2024-09-12 | Stop reason: HOSPADM

## 2024-09-11 SDOH — HEALTH STABILITY: MENTAL HEALTH
HOW OFTEN DO YOU NEED TO HAVE SOMEONE HELP YOU WHEN YOU READ INSTRUCTIONS, PAMPHLETS, OR OTHER WRITTEN MATERIAL FROM YOUR DOCTOR OR PHARMACY?: NEVER

## 2024-09-11 SDOH — SOCIAL STABILITY: SOCIAL NETWORK
DO YOU BELONG TO ANY CLUBS OR ORGANIZATIONS SUCH AS CHURCH GROUPS UNIONS, FRATERNAL OR ATHLETIC GROUPS, OR SCHOOL GROUPS?: NO

## 2024-09-11 SDOH — SOCIAL STABILITY: SOCIAL NETWORK: ARE YOU MARRIED, WIDOWED, DIVORCED, SEPARATED, NEVER MARRIED, OR LIVING WITH A PARTNER?: LIVING WITH PARTNER

## 2024-09-11 SDOH — SOCIAL STABILITY: SOCIAL INSECURITY: WITHIN THE LAST YEAR, HAVE YOU BEEN HUMILIATED OR EMOTIONALLY ABUSED IN OTHER WAYS BY YOUR PARTNER OR EX-PARTNER?: NO

## 2024-09-11 SDOH — HEALTH STABILITY: MENTAL HEALTH: HOW OFTEN DO YOU HAVE A DRINK CONTAINING ALCOHOL?: MONTHLY OR LESS

## 2024-09-11 SDOH — ECONOMIC STABILITY: INCOME INSECURITY: HOW HARD IS IT FOR YOU TO PAY FOR THE VERY BASICS LIKE FOOD, HOUSING, MEDICAL CARE, AND HEATING?: NOT VERY HARD

## 2024-09-11 SDOH — ECONOMIC STABILITY: FOOD INSECURITY: WITHIN THE PAST 12 MONTHS, YOU WORRIED THAT YOUR FOOD WOULD RUN OUT BEFORE YOU GOT MONEY TO BUY MORE.: NEVER TRUE

## 2024-09-11 SDOH — ECONOMIC STABILITY: INCOME INSECURITY: IN THE PAST 12 MONTHS, HAS THE ELECTRIC, GAS, OIL, OR WATER COMPANY THREATENED TO SHUT OFF SERVICE IN YOUR HOME?: NO

## 2024-09-11 SDOH — HEALTH STABILITY: MENTAL HEALTH: HOW OFTEN DO YOU HAVE 6 OR MORE DRINKS ON ONE OCCASION?: LESS THAN MONTHLY

## 2024-09-11 SDOH — HEALTH STABILITY: PHYSICAL HEALTH: ON AVERAGE, HOW MANY DAYS PER WEEK DO YOU ENGAGE IN MODERATE TO STRENUOUS EXERCISE (LIKE A BRISK WALK)?: 7 DAYS

## 2024-09-11 SDOH — SOCIAL STABILITY: SOCIAL INSECURITY
WITHIN THE LAST YEAR, HAVE YOU BEEN KICKED, HIT, SLAPPED, OR OTHERWISE PHYSICALLY HURT BY YOUR PARTNER OR EX-PARTNER?: NO

## 2024-09-11 SDOH — SOCIAL STABILITY: SOCIAL INSECURITY
WITHIN THE LAST YEAR, HAVE TO BEEN RAPED OR FORCED TO HAVE ANY KIND OF SEXUAL ACTIVITY BY YOUR PARTNER OR EX-PARTNER?: NO

## 2024-09-11 SDOH — ECONOMIC STABILITY: INCOME INSECURITY: IN THE LAST 12 MONTHS, WAS THERE A TIME WHEN YOU WERE NOT ABLE TO PAY THE MORTGAGE OR RENT ON TIME?: NO

## 2024-09-11 SDOH — ECONOMIC STABILITY: TRANSPORTATION INSECURITY
IN THE PAST 12 MONTHS, HAS THE LACK OF TRANSPORTATION KEPT YOU FROM MEDICAL APPOINTMENTS OR FROM GETTING MEDICATIONS?: NO

## 2024-09-11 SDOH — ECONOMIC STABILITY: FOOD INSECURITY: WITHIN THE PAST 12 MONTHS, THE FOOD YOU BOUGHT JUST DIDN'T LAST AND YOU DIDN'T HAVE MONEY TO GET MORE.: NEVER TRUE

## 2024-09-11 SDOH — ECONOMIC STABILITY: HOUSING INSECURITY: AT ANY TIME IN THE PAST 12 MONTHS, WERE YOU HOMELESS OR LIVING IN A SHELTER (INCLUDING NOW)?: NO

## 2024-09-11 SDOH — SOCIAL STABILITY: SOCIAL NETWORK
IN A TYPICAL WEEK, HOW MANY TIMES DO YOU TALK ON THE PHONE WITH FAMILY, FRIENDS, OR NEIGHBORS?: MORE THAN THREE TIMES A WEEK

## 2024-09-11 SDOH — HEALTH STABILITY: MENTAL HEALTH
STRESS IS WHEN SOMEONE FEELS TENSE, NERVOUS, ANXIOUS, OR CAN'T SLEEP AT NIGHT BECAUSE THEIR MIND IS TROUBLED. HOW STRESSED ARE YOU?: NOT AT ALL

## 2024-09-11 SDOH — SOCIAL STABILITY: SOCIAL NETWORK: HOW OFTEN DO YOU ATTENT MEETINGS OF THE CLUB OR ORGANIZATION YOU BELONG TO?: NEVER

## 2024-09-11 SDOH — HEALTH STABILITY: MENTAL HEALTH: HOW OFTEN DO YOU HAVE A DRINK CONTAINING ALCOHOL?: 2-4 TIMES A MONTH

## 2024-09-11 SDOH — ECONOMIC STABILITY: INCOME INSECURITY: HOW HARD IS IT FOR YOU TO PAY FOR THE VERY BASICS LIKE FOOD, HOUSING, MEDICAL CARE, AND HEATING?: NOT HARD AT ALL

## 2024-09-11 SDOH — SOCIAL STABILITY: SOCIAL INSECURITY: WITHIN THE LAST YEAR, HAVE YOU BEEN AFRAID OF YOUR PARTNER OR EX-PARTNER?: NO

## 2024-09-11 SDOH — HEALTH STABILITY: MENTAL HEALTH: HOW MANY STANDARD DRINKS CONTAINING ALCOHOL DO YOU HAVE ON A TYPICAL DAY?: 1 OR 2

## 2024-09-11 SDOH — SOCIAL STABILITY: SOCIAL NETWORK: HOW OFTEN DO YOU ATTEND CHURCH OR RELIGIOUS SERVICES?: NEVER

## 2024-09-11 SDOH — ECONOMIC STABILITY: TRANSPORTATION INSECURITY
IN THE PAST 12 MONTHS, HAS LACK OF TRANSPORTATION KEPT YOU FROM MEETINGS, WORK, OR FROM GETTING THINGS NEEDED FOR DAILY LIVING?: NO

## 2024-09-11 SDOH — SOCIAL STABILITY: SOCIAL NETWORK: HOW OFTEN DO YOU GET TOGETHER WITH FRIENDS OR RELATIVES?: MORE THAN THREE TIMES A WEEK

## 2024-09-11 SDOH — HEALTH STABILITY: PHYSICAL HEALTH: ON AVERAGE, HOW MANY MINUTES DO YOU ENGAGE IN EXERCISE AT THIS LEVEL?: 50 MIN

## 2024-09-11 SDOH — ECONOMIC STABILITY: HOUSING INSECURITY: IN THE PAST 12 MONTHS, HOW MANY TIMES HAVE YOU MOVED WHERE YOU WERE LIVING?: 0

## 2024-09-11 ASSESSMENT — ACTIVITIES OF DAILY LIVING (ADL)
PATIENT'S MEMORY ADEQUATE TO SAFELY COMPLETE DAILY ACTIVITIES?: NO
TOILETING: INDEPENDENT
BATHING: INDEPENDENT
WALKS IN HOME: INDEPENDENT
FEEDING YOURSELF: INDEPENDENT
HEARING - RIGHT EAR: FUNCTIONAL
GROOMING: INDEPENDENT
JUDGMENT_ADEQUATE_SAFELY_COMPLETE_DAILY_ACTIVITIES: NO
HEARING - LEFT EAR: FUNCTIONAL
ADEQUATE_TO_COMPLETE_ADL: NO
DRESSING YOURSELF: INDEPENDENT
LACK_OF_TRANSPORTATION: NO

## 2024-09-11 ASSESSMENT — COGNITIVE AND FUNCTIONAL STATUS - GENERAL
MOBILITY SCORE: 24
DAILY ACTIVITIY SCORE: 24
MOBILITY SCORE: 24
PATIENT BASELINE BEDBOUND: NO
DAILY ACTIVITIY SCORE: 24

## 2024-09-11 ASSESSMENT — PAIN SCALES - GENERAL
PAINLEVEL_OUTOF10: 5 - MODERATE PAIN
PAINLEVEL_OUTOF10: 5 - MODERATE PAIN
PAINLEVEL_OUTOF10: 3
PAINLEVEL_OUTOF10: 5 - MODERATE PAIN
PAINLEVEL_OUTOF10: 3

## 2024-09-11 ASSESSMENT — PAIN DESCRIPTION - LOCATION
LOCATION: BUTTOCKS

## 2024-09-11 ASSESSMENT — PAIN DESCRIPTION - PAIN TYPE
TYPE: ACUTE PAIN
TYPE: ACUTE PAIN

## 2024-09-11 ASSESSMENT — LIFESTYLE VARIABLES
AUDIT-C TOTAL SCORE: 3
TOTAL SCORE: 0
EVER FELT BAD OR GUILTY ABOUT YOUR DRINKING: NO
HAVE YOU EVER FELT YOU SHOULD CUT DOWN ON YOUR DRINKING: NO
SKIP TO QUESTIONS 9-10: 0
HAVE PEOPLE ANNOYED YOU BY CRITICIZING YOUR DRINKING: NO
AUDIT-C TOTAL SCORE: 2
EVER HAD A DRINK FIRST THING IN THE MORNING TO STEADY YOUR NERVES TO GET RID OF A HANGOVER: NO
SKIP TO QUESTIONS 9-10: 0

## 2024-09-11 ASSESSMENT — PAIN - FUNCTIONAL ASSESSMENT
PAIN_FUNCTIONAL_ASSESSMENT: 0-10

## 2024-09-11 ASSESSMENT — PAIN DESCRIPTION - FREQUENCY: FREQUENCY: CONSTANT/CONTINUOUS

## 2024-09-11 ASSESSMENT — PAIN DESCRIPTION - DESCRIPTORS
DESCRIPTORS: BURNING
DESCRIPTORS: SHARP;SHOOTING

## 2024-09-11 ASSESSMENT — COLUMBIA-SUICIDE SEVERITY RATING SCALE - C-SSRS
2. HAVE YOU ACTUALLY HAD ANY THOUGHTS OF KILLING YOURSELF?: NO
6. HAVE YOU EVER DONE ANYTHING, STARTED TO DO ANYTHING, OR PREPARED TO DO ANYTHING TO END YOUR LIFE?: NO
1. IN THE PAST MONTH, HAVE YOU WISHED YOU WERE DEAD OR WISHED YOU COULD GO TO SLEEP AND NOT WAKE UP?: NO

## 2024-09-11 ASSESSMENT — PAIN DESCRIPTION - ORIENTATION: ORIENTATION: LEFT

## 2024-09-11 NOTE — ED PROVIDER NOTES
HPI   Chief Complaint   Patient presents with    Trauma       59-year-old male brought in by EMS as a limited trauma activation after fall from ladder.  Patient reports that he was working on a ladder and his feet were approximately 7 to 8 feet from the ground.  Fell backwards and landed on his left buttock that back on his head on concrete.  Has large hematoma on the back of his head.  No loss of consciousness reported.  No blood thinner use.  Denies any neck or back pain.  No chest or abdominal pain.  Has pain to the back of his head and to the left buttock.  Patient was able to ambulate to the cot for EMS.      History provided by:  Patient and EMS personnel          Patient History   Past Medical History:   Diagnosis Date    Migraine, unspecified, not intractable, without status migrainosus     Migraines    Pain in unspecified knee     Knee pain    Personal history of other diseases of the circulatory system     History of hypertension    Personal history of other diseases of the digestive system     History of gastroesophageal reflux (GERD)    Personal history of other specified conditions     History of multiple pulmonary nodules     Past Surgical History:   Procedure Laterality Date    COLONOSCOPY W/ POLYPECTOMY  2019    Complete Colonoscopy For Polyp Removal    OTHER SURGICAL HISTORY  2019    Finger surgical procedure    OTHER SURGICAL HISTORY  2019    Wrist surgery     No family history on file.  Social History     Tobacco Use    Smoking status: Former     Current packs/day: 0.00     Types: Cigarettes     Quit date:      Years since quittin.7    Smokeless tobacco: Never   Vaping Use    Vaping status: Never Used   Substance Use Topics    Alcohol use: Yes    Drug use: Never       Physical Exam   ED Triage Vitals [24 1630]   Temperature Heart Rate Respirations BP   36.4 °C (97.5 °F) 70 16 (!) 162/99      Pulse Ox Temp Source Heart Rate Source Patient Position   95 % Temporal --  --      BP Location FiO2 (%)     -- --       Physical Exam  Vitals and nursing note reviewed.   Constitutional:       General: He is not in acute distress.  HENT:      Head:      Comments: Occipital hematoma     Mouth/Throat:      Mouth: Mucous membranes are moist.      Pharynx: Oropharynx is clear.   Eyes:      Extraocular Movements: Extraocular movements intact.      Conjunctiva/sclera: Conjunctivae normal.      Pupils: Pupils are equal, round, and reactive to light.   Neck:      Comments: No midline tenderness of C-spine.  Cardiovascular:      Rate and Rhythm: Normal rate and regular rhythm.      Pulses: Normal pulses.   Pulmonary:      Effort: Pulmonary effort is normal. No respiratory distress.      Breath sounds: Normal breath sounds.   Abdominal:      General: There is no distension.      Palpations: Abdomen is soft.      Tenderness: There is no abdominal tenderness. There is no guarding or rebound.   Musculoskeletal:         General: No deformity.      Cervical back: Neck supple. No tenderness.      Comments: No midline tenderness of T or L-spine.   Skin:     General: Skin is warm and dry.      Comments: Superficial skin abrasion left gluteal region   Neurological:      Mental Status: He is alert and oriented to person, place, and time. Mental status is at baseline.      Cranial Nerves: No cranial nerve deficit.      Sensory: No sensory deficit.      Motor: No weakness.   Psychiatric:         Mood and Affect: Mood normal.         Behavior: Behavior normal.           ED Course & MDM   Diagnoses as of 09/11/24 1750   Fall from ladder, initial encounter   Traumatic hematoma of buttock, initial encounter   Pancreatic lesion ([facility])                 No data recorded     Cyndi Coma Scale Score: 15 (09/11/24 1635 : Sincere Borjas RN)                     Labs Reviewed   CBC WITH AUTO DIFFERENTIAL - Abnormal       Result Value    WBC 8.2      nRBC 0.0      RBC 4.68      Hemoglobin 14.0      Hematocrit 41.7      MCV  89      MCH 29.9      MCHC 33.6      RDW 12.6      Platelets 235      Neutrophils % 55.1      Immature Granulocytes %, Automated 1.1 (*)     Lymphocytes % 24.0      Monocytes % 11.9      Eosinophils % 6.6      Basophils % 1.3      Neutrophils Absolute 4.51      Immature Granulocytes Absolute, Automated 0.09      Lymphocytes Absolute 1.96      Monocytes Absolute 0.97      Eosinophils Absolute 0.54      Basophils Absolute 0.11 (*)    COMPREHENSIVE METABOLIC PANEL - Abnormal    Glucose 97      Sodium 141      Potassium 3.4 (*)     Chloride 103      Bicarbonate 31      Anion Gap 10      Urea Nitrogen 25 (*)     Creatinine 0.97      eGFR >90      Calcium 9.5      Albumin 4.2      Alkaline Phosphatase 50      Total Protein 6.3 (*)     AST 17      Bilirubin, Total 0.5      ALT 19     ALCOHOL - Normal    Alcohol <10     LACTATE - Normal    Lactate 0.9      Narrative:     Venipuncture immediately after or during the administration of Metamizole may lead to falsely low results. Testing should be performed immediately  prior to Metamizole dosing.   PROTIME-INR - Normal    Protime 11.2      INR 1.0     TYPE AND SCREEN    ABO TYPE AB      Rh TYPE POS      ANTIBODY SCREEN NEG       CT thoracic spine wo IV contrast   Final Result   No acute osseous abnormality in the thoracic or lumbar spine.        MACRO:   None        Signed by: Lucho Almeida 9/11/2024 5:31 PM   Dictation workstation:   [id number]      CT lumbar spine wo IV contrast   Final Result   No acute osseous abnormality in the thoracic or lumbar spine.        MACRO:   None        Signed by: Lucho Almeida 9/11/2024 5:31 PM   Dictation workstation:   [id number]      CT chest abdomen pelvis w IV contrast   Final Result   Mildly hyperattenuating structure in the left buttock subcutaneous   fat and adjacent gluteus salima muscle suggestive of a large   hematoma. Multiple foci of active extravasation versus pseudoaneurysm.        Slightly worsening pulmonary  tree-in-bud opacities suggesting   atypical infection.        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   Recommend follow-up with nonemergent pancreas MRI.        Lucho Almeida discussed the significance and urgency of this   critical finding by telephone with Dr. JERONIMO GILL on 9/11/2024   at 5:11 pm.  (**-RCF-**) Findings:  See findings.             Signed by: Lucho Almeida 9/11/2024 5:12 PM   Dictation workstation:   [id number]      XR chest 1 view   Final Result   1.  No evidence of acute cardiopulmonary process.                  MACRO:   None        Signed by: Iva Dye 9/11/2024 4:56 PM   Dictation workstation:   [id number]      XR pelvis 1-2 views   Final Result   No acute fracture or dislocation.             Signed by: Iva Dye 9/11/2024 4:57 PM   Dictation workstation:   [id number]      CT head W O contrast trauma protocol   Final Result   Small left parieto-occipital scalp hematoma without underlying   fracture or intracranial hemorrhage.        Signed by: Kelby Olivas 9/11/2024 4:51 PM   Dictation workstation:   [id number]      CT cervical spine wo IV contrast   Final Result   Cervical spondylosis without acute fracture or facet subluxation.        Signed by: Kelby Olivas 9/11/2024 4:53 PM   Dictation workstation:   [id number]              Medical Decision Making  59-year-old male presenting as limited trauma via EMS after fall from ladder.  Feet approximately 7 to 8 feet above ground prior to fall.  No LOC.  No blood thinners.  GCS 15.  Mildly hypertensive on evaluation but otherwise hemodynamically stable.  Lungs are clear and equal.  Airway intact.  Equal pulses.  Neurologically intact.  CT imaging obtained to rule out acute traumatic process including intracranial hemorrhage, skull fracture, pelvic fracture.    Chest x-ray reviewed at bedside without evidence of pneumothorax or other acute traumatic process.  Single view pelvis x-ray reviewed at bedside  without evidence of acute fracture.  CT imaging of the hand reviewed by myself and does not demonstrate evidence of intracranial hemorrhage.  Confirmed by radiology.  No underlying skull fracture.  Parietal-occipital hematoma present.  CT C-spine negative for acute traumatic findings.  No C-spine fractures.  No T or L-spine fractures on CT imaging.  CT chest abdomen pelvis with large hematoma in the left buttock in the left gluteus salima and subcutaneous fat with evidence of active extravasation. Ice applied.  Incidental finding of pancreatic lesion on CT imaging.  Discussed this finding with patient and recommended outpatient dedicated MRI.  Case discussed with trauma surgery on-call, Dr. Lizarraga.  Recommended observation admission for monitoring of hematoma and H&H.  Patient agreeable to plan.        Procedure  Critical Care    Performed by: Devin Montoya MD  Authorized by: Devin Montoya MD    Critical care provider statement:     Critical care time (minutes):  32    Critical care time was exclusive of:  Separately billable procedures and treating other patients    Critical care was necessary to treat or prevent imminent or life-threatening deterioration of the following conditions:  Trauma    Critical care was time spent personally by me on the following activities:  Ordering and performing treatments and interventions, ordering and review of laboratory studies, ordering and review of radiographic studies, re-evaluation of patient's condition, discussions with consultants, examination of patient and obtaining history from patient or surrogate    Care discussed with: admitting provider         Deivn Montoya MD  09/11/24 [id number]

## 2024-09-11 NOTE — ED TRIAGE NOTES
Pt bib EMS after a fall 8ft off of ladder at home.  Pt comes in not in distress, only complaining of left butt check pain and back head pain.  Wound noted at the site mentioned was a superficial abrasion.  Pt A&Ox4, on RA, moving all extremities, and vitals WNL for this patient.  Trauma assessment completed, pt transported to CT.

## 2024-09-12 VITALS
RESPIRATION RATE: 16 BRPM | TEMPERATURE: 98.2 F | SYSTOLIC BLOOD PRESSURE: 141 MMHG | DIASTOLIC BLOOD PRESSURE: 84 MMHG | BODY MASS INDEX: 26.96 KG/M2 | OXYGEN SATURATION: 94 % | HEART RATE: 63 BPM | WEIGHT: 182 LBS | HEIGHT: 69 IN

## 2024-09-12 LAB
HCT VFR BLD AUTO: 34.6 % (ref 41–52)
HCT VFR BLD AUTO: 35.5 % (ref 41–52)
HGB BLD-MCNC: 11.6 G/DL (ref 13.5–17.5)
HGB BLD-MCNC: 11.9 G/DL (ref 13.5–17.5)

## 2024-09-12 PROCEDURE — 96376 TX/PRO/DX INJ SAME DRUG ADON: CPT

## 2024-09-12 PROCEDURE — 2500000002 HC RX 250 W HCPCS SELF ADMINISTERED DRUGS (ALT 637 FOR MEDICARE OP, ALT 636 FOR OP/ED): Performed by: SURGERY

## 2024-09-12 PROCEDURE — 2500000001 HC RX 250 WO HCPCS SELF ADMINISTERED DRUGS (ALT 637 FOR MEDICARE OP): Performed by: SURGERY

## 2024-09-12 PROCEDURE — 36415 COLL VENOUS BLD VENIPUNCTURE: CPT | Performed by: SURGERY

## 2024-09-12 PROCEDURE — 2500000004 HC RX 250 GENERAL PHARMACY W/ HCPCS (ALT 636 FOR OP/ED): Performed by: SURGERY

## 2024-09-12 PROCEDURE — 85014 HEMATOCRIT: CPT | Performed by: SURGERY

## 2024-09-12 PROCEDURE — 99238 HOSP IP/OBS DSCHRG MGMT 30/<: CPT | Performed by: SURGERY

## 2024-09-12 PROCEDURE — 90656 IIV3 VACC NO PRSV 0.5 ML IM: CPT | Performed by: SURGERY

## 2024-09-12 PROCEDURE — 2500000001 HC RX 250 WO HCPCS SELF ADMINISTERED DRUGS (ALT 637 FOR MEDICARE OP)

## 2024-09-12 PROCEDURE — 90471 IMMUNIZATION ADMIN: CPT | Performed by: SURGERY

## 2024-09-12 PROCEDURE — 2500000002 HC RX 250 W HCPCS SELF ADMINISTERED DRUGS (ALT 637 FOR MEDICARE OP, ALT 636 FOR OP/ED)

## 2024-09-12 PROCEDURE — G0378 HOSPITAL OBSERVATION PER HR: HCPCS

## 2024-09-12 SDOH — SOCIAL STABILITY: SOCIAL INSECURITY: ARE THERE ANY APPARENT SIGNS OF INJURIES/BEHAVIORS THAT COULD BE RELATED TO ABUSE/NEGLECT?: NO

## 2024-09-12 SDOH — SOCIAL STABILITY: SOCIAL INSECURITY: HAVE YOU HAD ANY THOUGHTS OF HARMING ANYONE ELSE?: NO

## 2024-09-12 SDOH — SOCIAL STABILITY: SOCIAL INSECURITY: DOES ANYONE TRY TO KEEP YOU FROM HAVING/CONTACTING OTHER FRIENDS OR DOING THINGS OUTSIDE YOUR HOME?: NO

## 2024-09-12 SDOH — SOCIAL STABILITY: SOCIAL INSECURITY: HAS ANYONE EVER THREATENED TO HURT YOUR FAMILY OR YOUR PETS?: NO

## 2024-09-12 SDOH — SOCIAL STABILITY: SOCIAL INSECURITY: HAVE YOU HAD THOUGHTS OF HARMING ANYONE ELSE?: NO

## 2024-09-12 SDOH — SOCIAL STABILITY: SOCIAL INSECURITY: DO YOU FEEL UNSAFE GOING BACK TO THE PLACE WHERE YOU ARE LIVING?: NO

## 2024-09-12 SDOH — SOCIAL STABILITY: SOCIAL INSECURITY: ABUSE: ADULT

## 2024-09-12 SDOH — SOCIAL STABILITY: SOCIAL INSECURITY: WERE YOU ABLE TO COMPLETE ALL THE BEHAVIORAL HEALTH SCREENINGS?: YES

## 2024-09-12 SDOH — SOCIAL STABILITY: SOCIAL INSECURITY: ARE YOU OR HAVE YOU BEEN THREATENED OR ABUSED PHYSICALLY, EMOTIONALLY, OR SEXUALLY BY ANYONE?: NO

## 2024-09-12 SDOH — SOCIAL STABILITY: SOCIAL INSECURITY: DO YOU FEEL ANYONE HAS EXPLOITED OR TAKEN ADVANTAGE OF YOU FINANCIALLY OR OF YOUR PERSONAL PROPERTY?: NO

## 2024-09-12 ASSESSMENT — ACTIVITIES OF DAILY LIVING (ADL): LACK_OF_TRANSPORTATION: NO

## 2024-09-12 ASSESSMENT — COGNITIVE AND FUNCTIONAL STATUS - GENERAL
MOBILITY SCORE: 23
DAILY ACTIVITIY SCORE: 24
CLIMB 3 TO 5 STEPS WITH RAILING: A LITTLE

## 2024-09-12 ASSESSMENT — PAIN SCALES - GENERAL
PAINLEVEL_OUTOF10: 2
PAINLEVEL_OUTOF10: 0 - NO PAIN
PAINLEVEL_OUTOF10: 2
PAINLEVEL_OUTOF10: 0 - NO PAIN
PAINLEVEL_OUTOF10: 2
PAINLEVEL_OUTOF10: 2

## 2024-09-12 ASSESSMENT — PATIENT HEALTH QUESTIONNAIRE - PHQ9
1. LITTLE INTEREST OR PLEASURE IN DOING THINGS: NOT AT ALL
SUM OF ALL RESPONSES TO PHQ9 QUESTIONS 1 & 2: 0
2. FEELING DOWN, DEPRESSED OR HOPELESS: NOT AT ALL

## 2024-09-12 ASSESSMENT — PAIN DESCRIPTION - LOCATION
LOCATION: BUTTOCKS
LOCATION: HEAD

## 2024-09-12 ASSESSMENT — PAIN DESCRIPTION - DESCRIPTORS
DESCRIPTORS: ACHING

## 2024-09-12 ASSESSMENT — LIFESTYLE VARIABLES
AUDIT-C TOTAL SCORE: 2
SKIP TO QUESTIONS 9-10: 0
HOW OFTEN DO YOU HAVE A DRINK CONTAINING ALCOHOL: MONTHLY OR LESS
HOW MANY STANDARD DRINKS CONTAINING ALCOHOL DO YOU HAVE ON A TYPICAL DAY: 1 OR 2
HOW OFTEN DO YOU HAVE 6 OR MORE DRINKS ON ONE OCCASION: LESS THAN MONTHLY
AUDIT-C TOTAL SCORE: 2

## 2024-09-12 ASSESSMENT — PAIN - FUNCTIONAL ASSESSMENT
PAIN_FUNCTIONAL_ASSESSMENT: 0-10

## 2024-09-12 ASSESSMENT — PAIN DESCRIPTION - ORIENTATION: ORIENTATION: LEFT

## 2024-09-12 NOTE — NURSING NOTE
Pt large hematoma to left buttock is softer than on admission. Swelling of hematoma to occipital lobe has gone down as well this shift. Pt. Medicated with IV toradol.

## 2024-09-12 NOTE — H&P
General Surgery History and Physical  Patient: Figueroa Cabral  Unit/Bed: 616/616-A  YOB: 1965  MRN: [id number]  Acct: [id number]   Admitting Diagnosis: Pancreatic lesion (HHS-HCC) [K86.9]  Fall from ladder, initial encounter [W11.XXXA]  Traumatic hematoma of buttock, initial encounter [S30.0XXA]  Date:  9/11/2024  Hospital Day: 0  Attending: Betty Lizarraga MD       Complaint:  Chief Complaint   Patient presents with    Trauma        History of Present Illness:  72-year-old male who presented to [facility] ED via EMS after falling approximately 8 feet from a ladder.  Patient states he fell backward and landed on his left buttock and then back into his head onto a concrete surface.  Patient has large hematoma on the back of his head.  He reports no loss of consciousness, dizziness, lightheadedness.  He denies anticoagulant use however he states he takes a an 81 mg aspirin daily.  Large hematoma to the left buttock with ecchymosis.  Complains of pain in the left buttock if he lays in that area.    In the ED, patient's vital signs were stable.  Lab work showed slight hypokalemia at 3.4, BUN slightly elevated 25, however normal creatinine and GFR, normal LFTs, normal INR and pro time, unremarkable CBC, no hemoglobin 14, hematocrit 41.7.  CT of the thoracic and lumbar spine showed no fractures or acute abnormalities in the thoracic or lumbar spine.  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  Denies cough, shortness of breath, no leukocytosis.  X-ray of the chest showed no evidence of acute cardiopulmonary processes or fractures.  X-ray of the pelvis showed no acute fractures or dislocations and intact  pelvic ring.  CT of the head showed small left parieto-occipital scalp hematoma without fracture or intracranial hemorrhage, CT of the thoracic spine showed cervical spondylosis but no acute fracture or subluxation.  Patient will be admitted under the trauma service per Dr. Lizarraga for monitoring of his hematomas.    Allergies:  No Known Allergies     PMHx:  Past Medical History:   Diagnosis Date    Migraine, unspecified, not intractable, without status migrainosus     Migraines    Pain in unspecified knee     Knee pain    Personal history of other diseases of the circulatory system     History of hypertension    Personal history of other diseases of the digestive system     History of gastroesophageal reflux (GERD)    Personal history of other specified conditions     History of multiple pulmonary nodules       PSHx:  Past Surgical History:   Procedure Laterality Date    COLONOSCOPY W/ POLYPECTOMY  2019    Complete Colonoscopy For Polyp Removal    OTHER SURGICAL HISTORY  2019    Finger surgical procedure    OTHER SURGICAL HISTORY  2019    Wrist surgery       Social Hx:  Social History     Socioeconomic History    Marital status: Significant Other   Tobacco Use    Smoking status: Former     Current packs/day: 0.00     Types: Cigarettes     Quit date:      Years since quittin.7    Smokeless tobacco: Never   Vaping Use    Vaping status: Never Used   Substance and Sexual Activity    Alcohol use: Yes    Drug use: Never     Social Determinants of Health     Financial Resource Strain: Low Risk  (2024)    Overall Financial Resource Strain (CARDIA)     Difficulty of Paying Living Expenses: Not very hard   Food Insecurity: No Food Insecurity (2024)    Hunger Vital Sign     Worried About Running Out of Food in the Last Year: Never true     Ran Out of Food in the Last Year: Never true   Transportation Needs: No Transportation Needs (2024)    PRAPARE - Transportation     Lack of  Transportation (Medical): No     Lack of Transportation (Non-Medical): No   Physical Activity: Sufficiently Active (9/11/2024)    Exercise Vital Sign     Days of Exercise per Week: 7 days     Minutes of Exercise per Session: 50 min   Stress: No Stress Concern Present (9/11/2024)    [employer] - Occupational Stress Questionnaire     Feeling of Stress : Not at all   Social Connections: Moderately Isolated (9/11/2024)    Social Connection and Isolation Panel [NHANES]     Frequency of Communication with Friends and Family: More than three times a week     Frequency of Social Gatherings with Friends and Family: More than three times a week     Attends Worship Services: Never     Active Member of Clubs or Organizations: No     Attends Club or Organization Meetings: Never     Marital Status: Living with partner   Intimate Partner Violence: Not At Risk (9/11/2024)    Humiliation, Afraid, Rape, and Kick questionnaire     Fear of Current or Ex-Partner: No     Emotionally Abused: No     Physically Abused: No     Sexually Abused: No   Housing Stability: Low Risk  (9/11/2024)    Housing Stability Vital Sign     Unable to Pay for Housing in the Last Year: No     Number of Times Moved in the Last Year: 0     Homeless in the Last Year: No       Family Hx:  No family history on file.    Review of Systems:   Review of Systems   Musculoskeletal:         Complains of pain in the left buttock   All other systems reviewed and are negative.  Complains of pain in the left buttock    Physical Examination:    Visit Vitals  /63 (BP Location: Left arm, Patient Position: Lying)   Pulse 77   Temp 36.2 °C (97.2 °F) (Tympanic)   Resp 16      Physical Exam  Constitutional:       Appearance: Normal appearance.   HENT:      Head: Normocephalic.        Comments: Hematoma present     Nose: Nose normal.      Mouth/Throat:      Mouth: Mucous membranes are moist.   Eyes:      Pupils: Pupils are equal, round, and  reactive to light.   Cardiovascular:      Rate and Rhythm: Normal rate and regular rhythm.      Pulses: Normal pulses.           Dorsalis pedis pulses are 2+ on the right side and 2+ on the left side.      Heart sounds: Normal heart sounds.   Abdominal:      General: There is no distension.      Palpations: Abdomen is soft.      Tenderness: There is no abdominal tenderness. There is no guarding or rebound.   Musculoskeletal:         General: Swelling present. Normal range of motion.      Cervical back: Normal range of motion.        Legs:       Comments: Large hematoma present   Skin:     General: Skin is warm and dry.      Capillary Refill: Capillary refill takes less than 2 seconds.      Findings: Bruising present.      Comments: Ecchymosis to hematoma sites.   Neurological:      Mental Status: He is alert and oriented to person, place, and time.   Psychiatric:         Mood and Affect: Mood normal.         Behavior: Behavior normal.         Thought Content: Thought content normal.         Judgment: Judgment normal.         LABS:  CBC:   Lab Results   Component Value Date    WBC 8.2 09/11/2024    RBC 4.68 09/11/2024    HGB 14.0 09/11/2024    HCT 41.7 09/11/2024    MCV 89 09/11/2024    MCH 29.9 09/11/2024    MCHC 33.6 09/11/2024    RDW 12.6 09/11/2024     09/11/2024     CBC with Differential:    Lab Results   Component Value Date    WBC 8.2 09/11/2024    RBC 4.68 09/11/2024    HGB 14.0 09/11/2024    HCT 41.7 09/11/2024     09/11/2024    MCV 89 09/11/2024    MCH 29.9 09/11/2024    MCHC 33.6 09/11/2024    RDW 12.6 09/11/2024    NRBC 0.0 09/11/2024    LYMPHOPCT 24.0 09/11/2024    MONOPCT 11.9 09/11/2024    EOSPCT 6.6 09/11/2024    BASOPCT 1.3 09/11/2024    MONOSABS 0.97 09/11/2024    LYMPHSABS 1.96 09/11/2024    EOSABS 0.54 09/11/2024    BASOSABS 0.11 (H) 09/11/2024     CMP:    Lab Results   Component Value Date     09/11/2024    K 3.4 (L) 09/11/2024     09/11/2024    CO2 31 09/11/2024    BUN  "25 (H) 09/11/2024    CREATININE 0.97 09/11/2024    GLUCOSE 97 09/11/2024    PROT 6.3 (L) 09/11/2024    CALCIUM 9.5 09/11/2024    BILITOT 0.5 09/11/2024    ALKPHOS 50 09/11/2024    AST 17 09/11/2024    ALT 19 09/11/2024     BMP:    Lab Results   Component Value Date     09/11/2024    K 3.4 (L) 09/11/2024     09/11/2024    CO2 31 09/11/2024    BUN 25 (H) 09/11/2024    CREATININE 0.97 09/11/2024    CALCIUM 9.5 09/11/2024    GLUCOSE 97 09/11/2024     Magnesium:No results found for: \"MG\"  Troponin:  No results found for: \"TROPHS\"  Lipid Panel:  No results found for: \"HDL\", \"CHHDL\", \"VLDL\", \"TRIG\", \"NHDL\"     Current Medications:    Current Facility-Administered Medications:     acetaminophen (Tylenol) tablet 650 mg, 650 mg, oral, q6h PRN, Betty Lizarraga MD    flu vaccine trivalent (PF) (Fluarix/Fluzone/Flulaval) 6 months or greater injection, 0.5 mL, intramuscular, During hospitalization, Betty Lizarraga MD    ketorolac (Toradol) injection 30 mg, 30 mg, intravenous, q6h PRN, Betty Lizarraga MD    ondansetron (Zofran) injection 4 mg, 4 mg, intravenous, q6h PRN, Betty Lizarraga MD    oxyCODONE-acetaminophen (Percocet) 5-325 mg per tablet 2 tablet, 2 tablet, oral, q6h PRN, Betty Lizarraga MD    CT thoracic spine wo IV contrast    Result Date: 9/11/2024  Interpreted By:  Lucho Almeida, STUDY: CT THORACIC SPINE WO IV CONTRAST; CT LUMBAR SPINE WO IV CONTRAST; 9/11/2024 4:57 pm   INDICATION: Signs/Symptoms:Fall from ladder.     COMPARISON: None.   ACCESSION NUMBER(S): [id number]; [id number]   ORDERING CLINICIAN: JERONIMO JAIRO   TECHNIQUE: Axial CT images of the thoracic spine are obtained. Axial, coronal and sagittal reconstructions are submitted for review. Axial CT images of the lumbar spine are obtained. Axial, coronal and sagittal reconstructions are submitted for review. No additional contrast was administered however contrast from CT present on this exam.   FINDINGS: Thoracic spine: No acute " fracture or subluxation. No vertebral body or disc height loss. Scattered small marginal osteophytes. Areas of chronic endplate irregularity in the mid and lower thoracic spine. No facet arthropathy. No prevertebral hematoma.   Lumbar spine: No acute fracture or subluxation. No vertebral body or disc height loss. Scattered small early marginal osteophytes. No facet arthropathy. No prevertebral hematoma.       No acute osseous abnormality in the thoracic or lumbar spine.   MACRO: None   Signed by: Lucho Almeida 9/11/2024 5:31 PM Dictation workstation:   [id number]    CT lumbar spine wo IV contrast    Result Date: 9/11/2024  Interpreted By:  Lucho Almeida, STUDY: CT THORACIC SPINE WO IV CONTRAST; CT LUMBAR SPINE WO IV CONTRAST; 9/11/2024 4:57 pm   INDICATION: Signs/Symptoms:Fall from ladder.     COMPARISON: None.   ACCESSION NUMBER(S): [id number]; [id number]   ORDERING CLINICIAN: JERONIMO GILL   TECHNIQUE: Axial CT images of the thoracic spine are obtained. Axial, coronal and sagittal reconstructions are submitted for review. Axial CT images of the lumbar spine are obtained. Axial, coronal and sagittal reconstructions are submitted for review. No additional contrast was administered however contrast from CT present on this exam.   FINDINGS: Thoracic spine: No acute fracture or subluxation. No vertebral body or disc height loss. Scattered small marginal osteophytes. Areas of chronic endplate irregularity in the mid and lower thoracic spine. No facet arthropathy. No prevertebral hematoma.   Lumbar spine: No acute fracture or subluxation. No vertebral body or disc height loss. Scattered small early marginal osteophytes. No facet arthropathy. No prevertebral hematoma.       No acute osseous abnormality in the thoracic or lumbar spine.   MACRO: None   Signed by: Lucho Almeida 9/11/2024 5:31 PM Dictation workstation:   [id number]    CT chest abdomen pelvis w IV contrast    Result Date:  9/11/2024  Interpreted By:  Lucho Almeida, STUDY: CT CHEST ABDOMEN PELVIS W IV CONTRAST;  9/11/2024 4:57 pm   INDICATION: Signs/Symptoms:Trauma.   COMPARISON: CT chest dated 12/02/2023, CT abdomen and pelvis dated 10/15/2019   ACCESSION NUMBER(S): [id number]   ORDERING CLINICIAN: JERONIMO GILL   TECHNIQUE: CT of the chest, abdomen, and pelvis was performed.  Contiguous axial images were obtained at 3 mm slice thickness through the chest, abdomen and pelvis. Coronal and sagittal reconstructions at 3 mm slice thickness were performed. 100 ml of contrast Omnipaque 350 were administered intravenously without immediate complication.   FINDINGS: Lungs and Pleura: Slightly worsening tree-in-bud opacities in the lower lobes bilaterally, right-greater-than-left. Dominant right lower lobe pulmonary nodule measures 13 mm. New tree-in-bud opacities are also noted in the right middle lobe. No pleural effusion. No pneumothorax.   Mediastinum: No adenopathy by CT size criteria. No cardiomegaly or pericardial effusion. No thoracic aortic aneurysm. Small hiatal hernia.   Liver: The liver is unremarkable without focal lesion.   Gallbladder and Biliary: Unremarkable.   Pancreas: There is a 12 x 7 mm low-attenuation lesion in the posterior aspect of the distal pancreas favored to represent a side duct IPMN.   Spleen: No abnormality identified in the spleen.   Adrenals: No abnormality identified in either adrenal gland.   Urinary: No parenchymal abnormality identified in either kidney. No hydronephrosis.   Gastrointestinal/Peritoneum: No small or large bowel obstruction in the visualized abdomen. In the abdomen, there is no extraluminal air. No significant free fluid. Sigmoid colon diverticulum. No evidence of acute appendicitis.   Vascular: Abdominal aorta is normal in caliber.   Lymphatics: No enlarged lymph nodes by size criteria.   MSK/Body Wall/Chest Wall: No aggressive bony lesion identified. Soft tissue stranding in the  left side of the buttocks with 6.9 x 4.3 x 12.2 cm high attenuating structure in the subcutaneous fat as well as within the gluteus salima muscle suggestive of a hematoma. There is a 6 mm hyperattenuating structure internally suspicious for a focus of active extravasation versus a pseudoaneurysm, unable to fully characterize without noncontrast and delayed phases. Additional smaller adjacent focus of active extravasation versus pseudoaneurysm measuring 2 mm, a more linear elongated focus measuring 11 x 3 mm more superiorly, and a small 3 mm focus posterolaterally.       Mildly hyperattenuating structure in the left buttock subcutaneous fat and adjacent gluteus salima muscle suggestive of a large hematoma. Multiple foci of active extravasation versus pseudoaneurysm.   Slightly worsening pulmonary tree-in-bud opacities suggesting atypical infection.   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 Recommend follow-up with nonemergent pancreas MRI.   Lucho Almeida discussed the significance and urgency of this critical finding by telephone with Dr. JERONIMO GILL on 9/11/2024 at 5:11 pm.  (**-RCF-**) Findings:  See findings.     Signed by: Lucho Almeida 9/11/2024 5:12 PM Dictation workstation:   [id number]    XR pelvis 1-2 views    Result Date: 9/11/2024  Interpreted By:  Iva Dye, STUDY: XR PELVIS 1-2 VIEWS; 9/11/2024 4:53 pm   INDICATION: Signs/Symptoms:Trauma.   COMPARISON: None.   ACCESSION NUMBER(S): [id number]   ORDERING CLINICIAN: JERONIMO GILL   FINDINGS: No acute fracture or dislocation. No lytic or blastic lesions or periosteal reaction. Mild degenerative changes of both hips.       No acute fracture or dislocation.     Signed by: Iva Dye 9/11/2024 4:57 PM Dictation workstation:   [id number]    XR chest 1 view    Result Date: 9/11/2024  Interpreted By:  Iva Dye, STUDY: XR CHEST 1 VIEW;  9/11/2024 4:53 pm   INDICATION: Signs/Symptoms:Trauma.     COMPARISON:  07/16/2020   ACCESSION NUMBER(S): [id number]   ORDERING CLINICIAN: JERONIMO GILL   FINDINGS:         CARDIOMEDIASTINAL SILHOUETTE: Cardiomediastinal silhouette is normal in size and configuration.   LUNGS: Lungs are clear.   ABDOMEN: No remarkable upper abdominal findings.   BONES: No acute osseous changes.       1.  No evidence of acute cardiopulmonary process.       MACRO: None   Signed by: Iva Dye 9/11/2024 4:56 PM Dictation workstation:   [id number]    CT cervical spine wo IV contrast    Result Date: 9/11/2024  Interpreted By:  Kelby Olivas, STUDY: CT CERVICAL SPINE WO IV CONTRAST; 9/11/2024 4:48 pm   INDICATION: Signs/Symptoms:Fall from ladder   COMPARISON: None.   ACCESSION NUMBER(S): [id number]   ORDERING CLINICIAN: JERONIMO GILL   TECHNIQUE: Axial unenhanced images were obtained through the cervical spine. Sagittal and coronal post processing reconstruction images were obtained.   All CT examinations are performed with one or more of the following dose reduction techniques: Automated Exposure Control, adjustment of mA and/or kV according to patient size, or use of iterative reconstruction techniques.   FINDINGS: No fracture is seen. The vertebral body heights are maintained. The vertebral alignment is anatomic; this is confirmed on the sagittal reconstruction images. Degenerative changes involve the articulation between the odontoid process and anterior arch of the C1 vertebra. There is narrowing of the C4-C5, C5-C6 and C6-C7 discs with marginal osteophytes. There are also hypertrophic changes of facet joints bilaterally with resultant bilateral foraminal stenosis. Images from the thoracic inlet are unremarkable.       Cervical spondylosis without acute fracture or facet subluxation.   Signed by: Kelby Olivas 9/11/2024 4:53 PM Dictation workstation:   [id number]    CT head W O contrast trauma protocol    Result Date: 9/11/2024  Interpreted By:  Kelby Olivas, STUDY: CT HEAD W/O  CONTRAST TRAUMA PROTOCOL; 9/11/2024 4:48 pm   INDICATION: Signs/Symptoms:Fall from ladder, head trauma   COMPARISON: None   ACCESSION NUMBER(S): [id number]   ORDERING CLINICIAN: JERONIMO GILL   TECHNIQUE: Axial images were obtained through the brain. No IV contrast was administered.   All CT examinations are performed with one or more of the following dose reduction techniques: Automated Exposure Control, adjustment of mA and/or kV according to patient size, or use of iterative reconstruction techniques.   FINDINGS: The ventricles are normal in size and midline in position. There is no  intracranial hemorrhage. No mass or mass effect is seen. The osseous structures are unremarkable. Small left parieto-occipital scalp hematoma is present.       Small left parieto-occipital scalp hematoma without underlying fracture or intracranial hemorrhage.   Signed by: Kelby Olivas 9/11/2024 4:51 PM Dictation workstation:   [id number]       No results found for this or any previous visit from the past 1095 days.     Assessment:    59-year-old male admitted after fall from ladder.  The patient is admitted for monitoring of his hematomas-1 to the left occipital region of the head and large hematoma to the left buttock.  Patient is able to ambulate and has no complaints of dizziness, lightheadedness, headache, neck pain, back pain, cough, chest pain, shortness of breath, or other locations of pain.     Plan:    Hematoma to the head and left buttock  -Hold aspirin  - H&H in a.m.  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     Further recommendations per Dr. Lizarraga    Time spent  36  minutes obtaining labs, imaging,  recommendations, interview, assessment, examination, medication review/ordering, and EMR review.    Plan of care was discussed extensively with patient. Patient verbalized understanding through teach back method. All questions and concerns addressed upon examination.     Of note, this documentation is completed using the Dragon Dictation system (voice recognition software). There may be spelling and/or grammatical errors that were not corrected prior to final submission.    Electronically signed by ROSIE Ernst on 9/11/2024 at 8:42 PM

## 2024-09-12 NOTE — NURSING NOTE
Patient given AVS and reviewed. Patient verbalized understanding.  All belongings sent with patient.    Awaiting ride

## 2024-09-12 NOTE — DISCHARGE SUMMARY
Discharge Diagnosis  Fall from ladder, initial encounter    Issues Requiring Follow-Up  Left buttock hematoma    Test Results Pending At Discharge  Pending Labs       No current pending labs.            Hospital Course   Patient was admitted for observation, pressure dressing and ice pack, serial hemoglobin.  Pain remains very well-controlled on Toradol, no narcotic requirement.  Tertiary exam unremarkable.  Patient tolerating a diet.  No chest or abdominal pain.  No shortness of breath.  No nausea.  No neurologic change.  No musculoskeletal change beyond tenderness at the left buttock.  Large hematoma and ecchymosis stable to improved over the course of his hospital stay.  Discharged in good condition    Pertinent Physical Exam At Time of Discharge  Physical Exam    Home Medications     Medication List      CHANGE how you take these medications     rosuvastatin 5 mg tablet; Commonly known as: Crestor; Please take 1   tablet by mouth at bedtime.  Thank you.; What changed: how much to take,   how to take this, when to take this, additional instructions     CONTINUE taking these medications     ascorbic acid 500 mg tablet; Commonly known as: Vitamin C   aspirin 81 mg EC tablet; Please take 1 tablet by mouth with food every   day, same time each day please.  Thank you.   chlorthalidone 25 mg tablet; Commonly known as: [place]; Take 1 tablet   (25 mg) by mouth once daily with breakfast.   cholecalciferol 50 MCG (2000 UT) tablet; Commonly known as: Vitamin D-3   cyanocobalamin 1,000 mcg tablet; Commonly known as: Vitamin B-12   famotidine 20 mg tablet; Commonly known as: Pepcid   nitroglycerin 0.4 mg SL tablet; Commonly known as: Nitrostat; Place 1   tablet (0.4 mg) under the tongue every 5 minutes if needed for chest pain.   May repeat every 5 minutes for up to 3 doses.   potassium chloride CR 10 mEq ER tablet; Commonly known as: Klor-Con;   Please take 2 tablets by mouth with breakfast and a glass of water. ?Stay    upright 30 minutes. ?Thank you.   propranolol  mg 24 hr capsule; Commonly known as: Inderal LA; Take   1 capsule (120 mg) by mouth once daily.   tadalafil 10 mg tablet; Commonly known as: Cialis       Outpatient Follow-Up  Future Appointments   Date Time Provider Department Center   10/8/2024 11:20 AM Supa Montero MD [id number] [place]       Betty Lizarraga MD

## 2024-09-12 NOTE — CARE PLAN
Problem: Fall/Injury  Goal: Not fall by end of shift  Outcome: Progressing  Goal: Be free from injury by end of the shift  Outcome: Progressing  Goal: Verbalize understanding of personal risk factors for fall in the hospital  Outcome: Progressing  Goal: Verbalize understanding of risk factor reduction measures to prevent injury from fall in the home  Outcome: Progressing  Goal: Use assistive devices by end of the shift  Outcome: Progressing  Goal: Pace activities to prevent fatigue by end of the shift  Outcome: Progressing     Problem: Pain  Goal: Takes deep breaths with improved pain control throughout the shift  Outcome: Progressing  Goal: Turns in bed with improved pain control throughout the shift  Outcome: Progressing  Goal: Walks with improved pain control throughout the shift  Outcome: Progressing  Goal: Performs ADL's with improved pain control throughout shift  Outcome: Progressing  Goal: Participates in PT with improved pain control throughout the shift  Outcome: Progressing  Goal: Free from opioid side effects throughout the shift  Outcome: Progressing  Goal: Free from acute confusion related to pain meds throughout the shift  Outcome: Progressing   The patient's goals for the shift include pain control    The clinical goals for the shift include pt to remain HDS    Over the shift, the patient did not make progress toward the following goals. Barriers to progression include . Recommendations to address these barriers include .

## 2024-09-12 NOTE — PROGRESS NOTES
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 Yes   Type of Animals or Pets 2 Cats,Guinea pigs and fish   Home or Post Acute Services None   Expected Discharge Disposition Home   Does the patient need discharge transport arranged? No   Financial Resource Strain   How hard is it for you to pay for the very basics like food, housing, medical care, and heating? Not hard   Housing Stability   In the last 12 months, was there a time when you were not able to pay the mortgage or rent on time? N   In the past 12 months, how many times have you moved where you were living? 0   At any time in the past 12 months, were you homeless or living in a shelter (including now)? N   Transportation Needs   In the past 12 months, has lack of transportation kept you from medical appointments or from getting medications? no   In the past 12 months, has lack of transportation kept you from meetings, work, or from getting things needed for daily living? No     Pt admitted after fall from ladder at work and hit head and left buttock, no fractures, has hematomas. Pt states PTA completely ind no DME, drives, works, lives with significant other in 1 level mobile home with 4 steps to enter. PCP is Dr. Montero who pt follows with regularly. Pt states DC preference is home, declines home going needs. CT team will monitor case for progression and potential DC needs.

## 2024-09-12 NOTE — CARE PLAN
The patient's goals for the shift include pain control    The clinical goals for the shift include monitor labs    Problem: Fall/Injury  Goal: Not fall by end of shift  9/12/2024 1849 by Vanessa Sierra RN  Outcome: Adequate for Discharge  9/12/2024 1456 by Vanessa Sierra RN  Outcome: Progressing  Goal: Be free from injury by end of the shift  9/12/2024 1849 by Vanessa Sierra RN  Outcome: Adequate for Discharge  9/12/2024 1456 by Vanessa Sierra RN  Outcome: Progressing  Goal: Verbalize understanding of personal risk factors for fall in the hospital  9/12/2024 1849 by Vanessa Sierra RN  Outcome: Adequate for Discharge  9/12/2024 1456 by Vanessa Sierra RN  Outcome: Progressing  Goal: Verbalize understanding of risk factor reduction measures to prevent injury from fall in the home  9/12/2024 1849 by Vanessa Sierra RN  Outcome: Adequate for Discharge  9/12/2024 1456 by Vanessa Sierra RN  Outcome: Progressing  Goal: Use assistive devices by end of the shift  9/12/2024 1849 by Vanessa Sierra RN  Outcome: Adequate for Discharge  9/12/2024 1456 by Vanessa Sierra RN  Outcome: Progressing  Goal: Pace activities to prevent fatigue by end of the shift  9/12/2024 1849 by Vanessa Sierra RN  Outcome: Adequate for Discharge  9/12/2024 1456 by Vanessa Sierra RN  Outcome: Progressing     Problem: Pain  Goal: Takes deep breaths with improved pain control throughout the shift  9/12/2024 1849 by Vanessa Sierra RN  Outcome: Adequate for Discharge  9/12/2024 1456 by Vanessa Sierra RN  Outcome: Progressing  Goal: Turns in bed with improved pain control throughout the shift  9/12/2024 1849 by Vanessa Sierra RN  Outcome: Adequate for Discharge  9/12/2024 1456 by Vanessa Sierra RN  Outcome: Progressing  Goal: Walks with improved pain control throughout the shift  9/12/2024 1849 by Vanessa Sierra RN  Outcome: Adequate for Discharge  9/12/2024 1456 by Vanessa Sierra RN  Outcome: Progressing  Goal: Performs ADL's with improved pain control throughout  shift  9/12/2024 1849 by Vanessa Sierra RN  Outcome: Adequate for Discharge  9/12/2024 1456 by Vanessa Sierra RN  Outcome: Progressing  Goal: Participates in PT with improved pain control throughout the shift  9/12/2024 1849 by Vanessa Sierra RN  Outcome: Adequate for Discharge  9/12/2024 1456 by Vanessa Sierra RN  Outcome: Progressing  Goal: Free from opioid side effects throughout the shift  9/12/2024 1849 by Vanessa Sierra RN  Outcome: Adequate for Discharge  9/12/2024 1456 by Vanessa Sierra RN  Outcome: Progressing  Goal: Free from acute confusion related to pain meds throughout the shift  9/12/2024 1849 by Vanessa Sierra RN  Outcome: Adequate for Discharge  9/12/2024 1456 by Vanessa Sierra RN  Outcome: Progressing     Problem: Pain - Adult  Goal: Verbalizes/displays adequate comfort level or baseline comfort level  9/12/2024 1849 by Vanessa Sierra RN  Outcome: Adequate for Discharge  9/12/2024 1456 by Vanessa Sierra RN  Outcome: Progressing     Problem: Safety - Adult  Goal: Free from fall injury  9/12/2024 1849 by Vanessa Sierra RN  Outcome: Adequate for Discharge  9/12/2024 1456 by Vanessa Sierra RN  Outcome: Progressing     Problem: Discharge Planning  Goal: Discharge to home or other facility with appropriate resources  9/12/2024 1849 by Vanessa Sierra RN  Outcome: Adequate for Discharge  9/12/2024 1456 by Vanessa Sierra RN  Outcome: Progressing     Problem: Chronic Conditions and Co-morbidities  Goal: Patient's chronic conditions and co-morbidity symptoms are monitored and maintained or improved  9/12/2024 1849 by Vanessa Sierra RN  Outcome: Adequate for Discharge  9/12/2024 1456 by Vanessa Sierra RN  Outcome: Progressing

## 2024-09-12 NOTE — CARE PLAN
The patient's goals for the shift include pain control    The clinical goals for the shift include monitor labs    Problem: Fall/Injury  Goal: Not fall by end of shift  Outcome: Progressing  Goal: Be free from injury by end of the shift  Outcome: Progressing  Goal: Verbalize understanding of personal risk factors for fall in the hospital  Outcome: Progressing  Goal: Verbalize understanding of risk factor reduction measures to prevent injury from fall in the home  Outcome: Progressing  Goal: Use assistive devices by end of the shift  Outcome: Progressing  Goal: Pace activities to prevent fatigue by end of the shift  Outcome: Progressing     Problem: Pain  Goal: Takes deep breaths with improved pain control throughout the shift  Outcome: Progressing  Goal: Turns in bed with improved pain control throughout the shift  Outcome: Progressing  Goal: Walks with improved pain control throughout the shift  Outcome: Progressing  Goal: Performs ADL's with improved pain control throughout shift  Outcome: Progressing  Goal: Participates in PT with improved pain control throughout the shift  Outcome: Progressing  Goal: Free from opioid side effects throughout the shift  Outcome: Progressing  Goal: Free from acute confusion related to pain meds throughout the shift  Outcome: Progressing     Problem: Pain - Adult  Goal: Verbalizes/displays adequate comfort level or baseline comfort level  Outcome: Progressing     Problem: Safety - Adult  Goal: Free from fall injury  Outcome: Progressing     Problem: Discharge Planning  Goal: Discharge to home or other facility with appropriate resources  Outcome: Progressing     Problem: Chronic Conditions and Co-morbidities  Goal: Patient's chronic conditions and co-morbidity symptoms are monitored and maintained or improved  Outcome: Progressing

## 2024-09-13 ENCOUNTER — PATIENT OUTREACH (OUTPATIENT)
Dept: PRIMARY CARE | Facility: CLINIC | Age: 59
End: 2024-09-13
Payer: COMMERCIAL

## 2024-09-13 NOTE — PROGRESS NOTES
Discharge Facility: [facility]  Discharge Diagnosis: fall from ladder  Admission Date: 9/11/2024  Discharge Date: 9/12/2024    PCP Appointment Date: none  Specialist Appointment Date:   -Follow up with Betty Lizarraga in 2 weeks     Hospital Encounter and Summary Linked: Yes    Issues Requiring Follow-Up  Left buttock hematoma    CHANGE how you take these medications     rosuvastatin 5 mg tablet; Commonly known as: Crestor; Please take 1   tablet by mouth at bedtime.  Thank you.; What changed: how much to take, how to take this, when to take this, additional instructions    Two attempts were made to reach patient within two business days after discharge. Voicemail left with contact information for patient to call back with any non-emergent questions or concerns.

## 2024-09-25 ENCOUNTER — APPOINTMENT (OUTPATIENT)
Dept: SURGERY | Facility: CLINIC | Age: 59
End: 2024-09-25
Payer: COMMERCIAL

## 2024-09-25 VITALS
HEIGHT: 69 IN | BODY MASS INDEX: 28.58 KG/M2 | HEART RATE: 63 BPM | SYSTOLIC BLOOD PRESSURE: 143 MMHG | DIASTOLIC BLOOD PRESSURE: 86 MMHG | WEIGHT: 193 LBS

## 2024-09-25 DIAGNOSIS — D49.0 IPMN (INTRADUCTAL PAPILLARY MUCINOUS NEOPLASM): ICD-10-CM

## 2024-09-25 DIAGNOSIS — F07.81 POSTCONCUSSION SYNDROME: ICD-10-CM

## 2024-09-25 DIAGNOSIS — T14.8XXA HEMATOMA: Primary | ICD-10-CM

## 2024-09-26 ENCOUNTER — PATIENT OUTREACH (OUTPATIENT)
Dept: PRIMARY CARE | Facility: CLINIC | Age: 59
End: 2024-09-26
Payer: COMMERCIAL

## 2024-09-26 NOTE — PROGRESS NOTES
Unable to reach patient for call back after recent hospitalization.   M with call back number for patient to call if needed   If no voicemail available call attempts x 2 were made to contact the patient to assist with any questions or concerns patient may have.

## 2024-09-27 NOTE — PROGRESS NOTES
Subjective   Patient ID: Figueroa Cabral is a 59 y.o. male who presents for Hospital Follow-up ([facility] via hospital).  HPI  Status post fall off a ladder with extensive soft tissue hematoma to the left buttock.  Brief loss of consciousness at time of incident.    Since hospital discharge, patient reports extensive bruising which appears to be fading.  Improvement in pain, now minimal.  No pain medicine necessity.  Still feels that there is some swelling in the buttock but this is gradually decreasing.  Also notes trouble concentrating and mild headaches especially with reading on his phone, small print.  Light sensitivity.  No dizziness, nausea or vomiting.  No new complaints otherwise.    Review of Systems      Past Medical History:   Diagnosis Date    HLD (hyperlipidemia)     Migraine, unspecified, not intractable, without status migrainosus     Migraines    Pain in unspecified knee     Knee pain    Personal history of other diseases of the circulatory system     History of hypertension    Personal history of other diseases of the digestive system     History of gastroesophageal reflux (GERD)    Personal history of other specified conditions     History of multiple pulmonary nodules     Past Surgical History:   Procedure Laterality Date    COLONOSCOPY W/ POLYPECTOMY  2019    Complete Colonoscopy For Polyp Removal    OTHER SURGICAL HISTORY  2019    Finger surgical procedure    OTHER SURGICAL HISTORY  2019    Wrist surgery     No family history on file.   Social History     Socioeconomic History    Marital status: Significant Other   Tobacco Use    Smoking status: Former     Current packs/day: 0.00     Types: Cigarettes     Quit date:      Years since quittin.7    Smokeless tobacco: Never   Vaping Use    Vaping status: Never Used   Substance and Sexual Activity    Alcohol use: Yes    Drug use: Never     Social Determinants of Health     Financial Resource Strain: Low Risk  (2024)     Overall Financial Resource Strain (CARDIA)     Difficulty of Paying Living Expenses: Not hard at all   Food Insecurity: No Food Insecurity (9/11/2024)    Hunger Vital Sign     Worried About Running Out of Food in the Last Year: Never true     Ran Out of Food in the Last Year: Never true   Transportation Needs: No Transportation Needs (9/12/2024)    PRAPARE - Transportation     Lack of Transportation (Medical): No     Lack of Transportation (Non-Medical): No   Physical Activity: Sufficiently Active (9/11/2024)    Exercise Vital Sign     Days of Exercise per Week: 7 days     Minutes of Exercise per Session: 50 min   Stress: No Stress Concern Present (9/11/2024)    [employer] [place] [employer] - Occupational Stress Questionnaire     Feeling of Stress : Not at all   Social Connections: Moderately Isolated (9/11/2024)    Social Connection and Isolation Panel [NHANES]     Frequency of Communication with Friends and Family: More than three times a week     Frequency of Social Gatherings with Friends and Family: More than three times a week     Attends Yazidism Services: Never     Active Member of Clubs or Organizations: No     Attends Club or Organization Meetings: Never     Marital Status: Living with partner   Intimate Partner Violence: Not At Risk (9/11/2024)    Humiliation, Afraid, Rape, and Kick questionnaire     Fear of Current or Ex-Partner: No     Emotionally Abused: No     Physically Abused: No     Sexually Abused: No   Housing Stability: Low Risk  (9/12/2024)    Housing Stability Vital Sign     Unable to Pay for Housing in the Last Year: No     Number of Times Moved in the Last Year: 0     Homeless in the Last Year: No          Current Outpatient Medications:     ascorbic acid (Vitamin C) 500 mg tablet, Take 1 tablet (500 mg) by mouth once daily., Disp: 30 tablet, Rfl: 11    aspirin 81 mg EC tablet, Please take 1 tablet by mouth with food every day, same time each day please.  Thank you., Disp: 90  tablet, Rfl: 0    chlorthalidone (Hygroton) 25 mg tablet, Take 1 tablet (25 mg) by mouth once daily with breakfast., Disp: 90 tablet, Rfl: 0    cholecalciferol (Vitamin D-3) 50 MCG (2000 UT) tablet, Take 1 tablet (50 mcg) by mouth once daily., Disp: 30 tablet, Rfl: 11    cyanocobalamin (Vitamin B-12) 1,000 mcg tablet, Take 1 tablet (1,000 mcg) by mouth once daily., Disp: , Rfl:     famotidine (Pepcid) 20 mg tablet, Take 1 tablet (20 mg) by mouth once daily., Disp: , Rfl:     nitroglycerin (Nitrostat) 0.4 mg SL tablet, Place 1 tablet (0.4 mg) under the tongue every 5 minutes if needed for chest pain. May repeat every 5 minutes for up to 3 doses., Disp: 30 tablet, Rfl: 0    potassium chloride CR 10 mEq ER tablet, Please take 2 tablets by mouth with breakfast and a glass of water.  Stay upright 30 minutes.  Thank you., Disp: 180 tablet, Rfl: 0    propranolol LA (Inderal LA) 120 mg 24 hr capsule, Take 1 capsule (120 mg) by mouth once daily., Disp: 90 capsule, Rfl: 1    rosuvastatin (Crestor) 5 mg tablet, Please take 1 tablet by mouth at bedtime.  Thank you. (Patient taking differently: Take 1 tablet (5 mg) by mouth once daily at bedtime.), Disp: 90 tablet, Rfl: 0    tadalafil (Cialis) 10 mg tablet, Take 1 tablet (10 mg) by mouth once daily as needed for erectile dysfunction., Disp: , Rfl:      Objective   Vitals:    09/25/24 1427   BP: 143/86   Pulse: 63      Physical Exam  Constitutional: Alert, no acute distress  HEENT: Well-developed, anicteric  Neck: Supple  Chest: Unlabored respirations  Heart: Regular  Abdomen: Soft, nondistended, nontender no masses [] appreciated  Extremities: Warm and well perfused, no edema.  Extensive fading ecchymosis of the left buttock flank and posterior lateral thigh.  Good range of motion.  Some swelling in the soft tissue including subcutaneous fat and musculature of the gluteus.  Small fist sized persistent hematoma  Psychiatric: Oriented appropriately, mood and affect  appropriate  Neuro: Intact    Assessment/Plan   Problem List Items Addressed This Visit    None  Visit Diagnoses         Codes    Hematoma    -  Primary T14.8XXA    Postconcussion syndrome     F07.81        Soft tissue hematoma: Stable now improving.  Persistent hematoma may need an office aspiration for persistence.  At this point, we will continue to follow.  Resume previous level of activity gradually.  Postconcussion syndrome: Discussed etiology and management.  Patient to avoid exacerbating triggers including bright lights, small print, activity that may trigger dizziness including climbing or extreme bending.  Discussed return to work light duty to avoid climbing on ladders and placing himself at risk for injury in light of postconcussion syndrome and mild limitations to mobility.  1 month follow-up for reassessment    Incidental finding of pancreatic IPMN.  I do not manage these.  Will require further evaluation by his PCP, consideration for pancreatic MRI    Betty Lizarraga MD

## 2024-10-08 ENCOUNTER — APPOINTMENT (OUTPATIENT)
Dept: PRIMARY CARE | Facility: CLINIC | Age: 59
End: 2024-10-08
Payer: COMMERCIAL

## 2024-10-08 VITALS
WEIGHT: 191.7 LBS | HEART RATE: 71 BPM | DIASTOLIC BLOOD PRESSURE: 82 MMHG | TEMPERATURE: 98 F | BODY MASS INDEX: 28.39 KG/M2 | OXYGEN SATURATION: 93 % | SYSTOLIC BLOOD PRESSURE: 120 MMHG | HEIGHT: 69 IN

## 2024-10-08 DIAGNOSIS — E55.9 VITAMIN D DEFICIENCY: ICD-10-CM

## 2024-10-08 DIAGNOSIS — R73.9 BORDERLINE HYPERGLYCEMIA: ICD-10-CM

## 2024-10-08 DIAGNOSIS — S30.0XXA HEMATOMA OF BUTTOCK: ICD-10-CM

## 2024-10-08 DIAGNOSIS — Z91.89 FRAMINGHAM CARDIAC RISK 10-20% IN NEXT 10 YEARS: Chronic | ICD-10-CM

## 2024-10-08 DIAGNOSIS — K21.9 GASTROESOPHAGEAL REFLUX DISEASE, UNSPECIFIED WHETHER ESOPHAGITIS PRESENT: ICD-10-CM

## 2024-10-08 DIAGNOSIS — D64.9 ANEMIA, NORMOCYTIC NORMOCHROMIC: ICD-10-CM

## 2024-10-08 DIAGNOSIS — E66.3 OVERWEIGHT (BMI 25.0-29.9): ICD-10-CM

## 2024-10-08 DIAGNOSIS — S06.0X1S CONCUSSION WITH LOSS OF CONSCIOUSNESS OF 30 MINUTES OR LESS, SEQUELA (CMS-HCC): Primary | ICD-10-CM

## 2024-10-08 DIAGNOSIS — E87.6 HYPOKALEMIA: ICD-10-CM

## 2024-10-08 DIAGNOSIS — K86.89 PANCREATIC MASS (HHS-HCC): Chronic | ICD-10-CM

## 2024-10-08 DIAGNOSIS — R94.31 ABNORMAL EKG: Chronic | ICD-10-CM

## 2024-10-08 DIAGNOSIS — E78.2 MIXED HYPERLIPIDEMIA: ICD-10-CM

## 2024-10-08 DIAGNOSIS — G43.009 MIGRAINE WITHOUT AURA AND WITHOUT STATUS MIGRAINOSUS, NOT INTRACTABLE: ICD-10-CM

## 2024-10-08 DIAGNOSIS — I10 ESSENTIAL HYPERTENSION: ICD-10-CM

## 2024-10-08 PROCEDURE — 99214 OFFICE O/P EST MOD 30 MIN: CPT | Performed by: INTERNAL MEDICINE

## 2024-10-08 PROCEDURE — 3079F DIAST BP 80-89 MM HG: CPT | Performed by: INTERNAL MEDICINE

## 2024-10-08 PROCEDURE — 3008F BODY MASS INDEX DOCD: CPT | Performed by: INTERNAL MEDICINE

## 2024-10-08 PROCEDURE — 1036F TOBACCO NON-USER: CPT | Performed by: INTERNAL MEDICINE

## 2024-10-08 PROCEDURE — 3074F SYST BP LT 130 MM HG: CPT | Performed by: INTERNAL MEDICINE

## 2024-10-08 RX ORDER — CHLORTHALIDONE 25 MG/1
25 TABLET ORAL
Qty: 90 TABLET | Refills: 0 | Status: SHIPPED | OUTPATIENT
Start: 2024-10-08

## 2024-10-08 RX ORDER — POTASSIUM CHLORIDE 750 MG/1
TABLET, FILM COATED, EXTENDED RELEASE ORAL
Qty: 180 TABLET | Refills: 0 | Status: SHIPPED | OUTPATIENT
Start: 2024-10-08 | End: 2024-10-08

## 2024-10-08 RX ORDER — PROPRANOLOL HYDROCHLORIDE 120 MG/1
120 CAPSULE, EXTENDED RELEASE ORAL DAILY
Qty: 90 CAPSULE | Refills: 1 | Status: SHIPPED | OUTPATIENT
Start: 2024-10-08

## 2024-10-08 RX ORDER — POTASSIUM CHLORIDE 750 MG/1
TABLET, FILM COATED, EXTENDED RELEASE ORAL
Qty: 180 TABLET | Refills: 0 | Status: SHIPPED | OUTPATIENT
Start: 2024-10-08

## 2024-10-08 ASSESSMENT — PAIN SCALES - GENERAL: PAINLEVEL: 2

## 2024-10-08 NOTE — PROGRESS NOTES
"Subjective   Patient ID: Figueroa Cabrla is a 59 y.o. male who presents for Annual Exam (Complete Physical Exam).    HPI     Review of Systems    Objective   /82   Pulse 71   Temp 36.7 °C (98 °F) (Oral)   Ht 1.753 m (5' 9\")   Wt 87 kg (191 lb 11.2 oz)   SpO2 93%   BMI 28.31 kg/m²     Physical Exam    Assessment/Plan   Diagnoses and all orders for this visit:  Concussion with loss of consciousness of 30 minutes or less, sequela (CMS-HCC)  Comments:  09/2024  Orders:  -     CBC and Auto Differential; Future  -     Comprehensive Metabolic Panel; Future  -     Creatine Kinase; Future  -     Follow Up In Primary Care - Established; Future  Anemia, normocytic normochromic  -     CBC and Auto Differential; Future  -     Vitamin B12; Future  -     Folate; Future  -     Ferritin; Future  -     Iron and TIBC; Future  -     Urinalysis with Reflex Microscopic; Future  -     Follow Up In Primary Care - Established; Future  Hematoma of buttock  Comments:  LEFT side 09/2024  Orders:  -     CBC and Auto Differential; Future  -     Comprehensive Metabolic Panel; Future  -     Creatine Kinase; Future  -     Follow Up In Primary Care - Established; Future  Pancreatic mass ([facility])  Comments:  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  Orders:  -     CBC and Auto Differential; Future  -     Comprehensive Metabolic Panel; Future  -     Amylase; Future  -     Lipase; Future  -     Follow Up In Primary Care - Established; Future  Essential hypertension  -     Follow Up In Primary Care - Established  -     chlorthalidone (Hygroton) 25 mg tablet; Take 1 tablet (25 mg) by mouth once daily with breakfast.  -     propranolol LA (Inderal LA) 120 mg 24 hr capsule; Take 1 capsule (120 mg) by mouth once daily.  -     potassium chloride CR 10 mEq ER tablet; Please take 2 tablets by mouth with breakfast and a glass of water. Stay upright 30 minutes. Thank you.  -     CBC and Auto Differential; Future  -     " Urinalysis with Reflex Microscopic; Future  -     Comprehensive Metabolic Panel; Future  -     TSH with reflex to Free T4 if abnormal; Future  -     Magnesium; Future  -     Creatine Kinase; Future  -     Follow Up In Primary Care - Established; Future  Mixed hyperlipidemia  -     Comprehensive Metabolic Panel; Future  -     Lipid Panel; Future  -     Follow Up In Primary Care - Established; Future  Borderline hyperglycemia  -     Urinalysis with Reflex Microscopic; Future  -     Comprehensive Metabolic Panel; Future  -     Hemoglobin A1C; Future  -     Follow Up In Primary Care - Established; Future  Abnormal EKG  Comments:  T INV II, II, F, V4-V6 08/2024  Orders:  -     CBC and Auto Differential; Future  -     Comprehensive Metabolic Panel; Future  -     TSH with reflex to Free T4 if abnormal; Future  -     Magnesium; Future  -     Follow Up In Primary Care - Established; Future  [place] cardiac risk 10-20% in next 10 years  Comments:  12.6% 07/2024  Orders:  -     Comprehensive Metabolic Panel; Future  -     Lipid Panel; Future  -     Hemoglobin A1C; Future  -     Follow Up In Primary Care - Established; Future  Hypokalemia  -     potassium chloride CR 10 mEq ER tablet; Please take 2 tablets by mouth with breakfast and a glass of water. Stay upright 30 minutes. Thank you.  -     Comprehensive Metabolic Panel; Future  -     Follow Up In Primary Care - Established; Future  Gastroesophageal reflux disease, unspecified whether esophagitis present  -     CBC and Auto Differential; Future  -     Follow Up In Primary Care - Established; Future  Overweight (BMI 25.0-29.9)  -     Comprehensive Metabolic Panel; Future  -     TSH with reflex to Free T4 if abnormal; Future  -     Follow Up In Primary Care - Established; Future  Migraine without aura and without status migrainosus, not intractable  -     propranolol LA (Inderal LA) 120 mg 24 hr capsule; Take 1 capsule (120 mg) by mouth once daily.  -     CBC and Auto  Differential; Future  -     Comprehensive Metabolic Panel; Future  -     Follow Up In Primary Care - Established; Future  Vitamin D deficiency  -     Comprehensive Metabolic Panel; Future  -     Follow Up In Primary Care - Established; Future       Thank you very much for coming.  I am very happy to see you.    Please continue to follow with GS, Dr. Lizarraga, as she recommends.  In the meantime, you seem to be showing no signs of deficits as far as injury to your brain is concerned.  If you have any troublesome symptoms, dizziness, imbalance, falling, or confusion, or pain, please let me know.    I will also be checking some markers for mood and memory, B12, folic acid.  I will send word regarding results and possible changes.    I am also concerned about your low red cell count or ANEMIA.  Indeed, it could be from the bruising along your buttock, with some leftover bruising along the left side, but your loss is consistent with at least a pint of blood.  Let us recheck some markers.    They also did find a lump in your PANCREAS which is most likely benign or of no consequence.  Let us do some blood examinations, and if there is anything significant, we will then order an MRI of your pancreas.    In the meantime, watch out for abdominal distress, nausea, vomiting.  Please let me know if you have any symptoms.    Please do some FASTING BLOOD and URINE examinations tomorrow.  I will send word regarding results and possible changes.    I am glad to hear that the CARDIOLOGIST felt that you are doing well.    Please continue taking your VITAMIN D supplementation.    Thank you for taking care of your VACCINATIONS.  The only vaccination missing at this time is the SHINGLES series.  Find out if your insurance will pay for it.  You can get this from your local friendly pharmacy.    You are also recommended to have the COVID vaccination if you desire.    Please come back in 2 months, but call me with any questions or concerns.   911 first of course if urgent.  Take care and God bless.            0  Return in 2 months.  20 minutes please.  Reassess mood, energy, function, recovery from fall, history of anemia, history of incidental pancreas finding.  Prepare for winter.  Coordinate with , cardiology.            0     6

## 2024-10-08 NOTE — PATIENT INSTRUCTIONS
Thank you very much for coming.  I am very happy to see you.    Please continue to follow with GS, Dr. Lizarraga, as she recommends.  In the meantime, you seem to be showing no signs of deficits as far as injury to your brain is concerned.  If you have any troublesome symptoms, dizziness, imbalance, falling, or confusion, or pain, please let me know.    I will also be checking some markers for mood and memory, B12, folic acid.  I will send word regarding results and possible changes.    I am also concerned about your low red cell count or ANEMIA.  Indeed, it could be from the bruising along your buttock, with some leftover bruising along the left side, but your loss is consistent with at least a pint of blood.  Let us recheck some markers.    They also did find a lump in your PANCREAS which is most likely benign or of no consequence.  Let us do some blood examinations, and if there is anything significant, we will then order an MRI of your pancreas.    In the meantime, watch out for abdominal distress, nausea, vomiting.  Please let me know if you have any symptoms.    Please do some FASTING BLOOD and URINE examinations tomorrow.  I will send word regarding results and possible changes.    I am glad to hear that the CARDIOLOGIST felt that you are doing well.    Please continue taking your VITAMIN D supplementation.    Thank you for taking care of your VACCINATIONS.  The only vaccination missing at this time is the SHINGLES series.  Find out if your insurance will pay for it.  You can get this from your local friendly pharmacy.    You are also recommended to have the COVID vaccination if you desire.    Please come back in 2 months, but call me with any questions or concerns.  911 first of course if urgent.  Take care and God bless.            0  Return in 2 months.  20 minutes please.  Reassess mood, energy, function, recovery from fall, history of anemia, history of incidental pancreas finding.  Prepare for winter.   Coordinate with GS, cardiology.            0

## 2024-10-09 PROCEDURE — 82150 ASSAY OF AMYLASE: CPT

## 2024-10-09 PROCEDURE — 82746 ASSAY OF FOLIC ACID SERUM: CPT

## 2024-10-09 PROCEDURE — 83735 ASSAY OF MAGNESIUM: CPT

## 2024-10-09 PROCEDURE — 83690 ASSAY OF LIPASE: CPT

## 2024-10-09 PROCEDURE — 85025 COMPLETE CBC W/AUTO DIFF WBC: CPT

## 2024-10-09 PROCEDURE — 83540 ASSAY OF IRON: CPT

## 2024-10-09 PROCEDURE — 82728 ASSAY OF FERRITIN: CPT

## 2024-10-09 PROCEDURE — 80061 LIPID PANEL: CPT

## 2024-10-09 PROCEDURE — 82550 ASSAY OF CK (CPK): CPT

## 2024-10-09 PROCEDURE — 83550 IRON BINDING TEST: CPT

## 2024-10-09 PROCEDURE — 81003 URINALYSIS AUTO W/O SCOPE: CPT

## 2024-10-09 PROCEDURE — 83036 HEMOGLOBIN GLYCOSYLATED A1C: CPT

## 2024-10-09 PROCEDURE — 80053 COMPREHEN METABOLIC PANEL: CPT

## 2024-10-09 PROCEDURE — 84443 ASSAY THYROID STIM HORMONE: CPT

## 2024-10-09 PROCEDURE — 82607 VITAMIN B-12: CPT

## 2024-10-23 ENCOUNTER — APPOINTMENT (OUTPATIENT)
Dept: SURGERY | Facility: CLINIC | Age: 59
End: 2024-10-23
Payer: COMMERCIAL

## 2024-10-23 VITALS
WEIGHT: 191 LBS | SYSTOLIC BLOOD PRESSURE: 147 MMHG | HEART RATE: 62 BPM | BODY MASS INDEX: 28.21 KG/M2 | OXYGEN SATURATION: 95 % | DIASTOLIC BLOOD PRESSURE: 89 MMHG

## 2024-10-23 DIAGNOSIS — F07.81 POSTCONCUSSION SYNDROME: Primary | ICD-10-CM

## 2024-10-23 DIAGNOSIS — T14.8XXA HEMATOMA: ICD-10-CM

## 2024-10-23 ASSESSMENT — PAIN SCALES - GENERAL: PAINLEVEL_OUTOF10: 0-NO PAIN

## 2024-10-23 NOTE — PROGRESS NOTES
Subjective   Patient ID: Figueroa Cabral is a 59 y.o. male who presents for Follow-up (1 month ).  HPI  Status post fall off a ladder with extensive soft tissue hematoma to the left buttock.  Brief loss of consciousness at time of incident.    Since last visit, doing well.  Resolved left buttock swelling.  Occasional aching particular as he increases his physical activity.  This is very focal and transient.  Resolved postconcussion symptoms.      Review of Systems      Past Medical History:   Diagnosis Date    HLD (hyperlipidemia)     Migraine, unspecified, not intractable, without status migrainosus     Migraines    Pain in unspecified knee     Knee pain    Personal history of other diseases of the circulatory system     History of hypertension    Personal history of other diseases of the digestive system     History of gastroesophageal reflux (GERD)    Personal history of other specified conditions     History of multiple pulmonary nodules     Past Surgical History:   Procedure Laterality Date    COLONOSCOPY W/ POLYPECTOMY  2019    Complete Colonoscopy For Polyp Removal    OTHER SURGICAL HISTORY  2019    Finger surgical procedure    OTHER SURGICAL HISTORY  2019    Wrist surgery     No family history on file.   Social History     Socioeconomic History    Marital status: Significant Other   Tobacco Use    Smoking status: Former     Current packs/day: 0.00     Types: Cigarettes     Quit date:      Years since quittin.8    Smokeless tobacco: Never   Vaping Use    Vaping status: Never Used   Substance and Sexual Activity    Alcohol use: Yes    Drug use: Never     Social Drivers of Health     Financial Resource Strain: Low Risk  (2024)    Overall Financial Resource Strain (CARDIA)     Difficulty of Paying Living Expenses: Not hard at all   Food Insecurity: No Food Insecurity (2024)    Hunger Vital Sign     Worried About Running Out of Food in the Last Year: Never true     Ran Out of  Food in the Last Year: Never true   Transportation Needs: No Transportation Needs (9/12/2024)    PRAPARE - Transportation     Lack of Transportation (Medical): No     Lack of Transportation (Non-Medical): No   Physical Activity: Sufficiently Active (9/11/2024)    Exercise Vital Sign     Days of Exercise per Week: 7 days     Minutes of Exercise per Session: 50 min   Stress: No Stress Concern Present (9/11/2024)    [employer] [place] [employer] - Occupational Stress Questionnaire     Feeling of Stress : Not at all   Social Connections: Moderately Isolated (9/11/2024)    Social Connection and Isolation Panel [NHANES]     Frequency of Communication with Friends and Family: More than three times a week     Frequency of Social Gatherings with Friends and Family: More than three times a week     Attends Tenriism Services: Never     Active Member of Clubs or Organizations: No     Attends Club or Organization Meetings: Never     Marital Status: Living with partner   Intimate Partner Violence: Not At Risk (9/11/2024)    Humiliation, Afraid, Rape, and Kick questionnaire     Fear of Current or Ex-Partner: No     Emotionally Abused: No     Physically Abused: No     Sexually Abused: No   Housing Stability: Low Risk  (9/12/2024)    Housing Stability Vital Sign     Unable to Pay for Housing in the Last Year: No     Number of Times Moved in the Last Year: 0     Homeless in the Last Year: No          Current Outpatient Medications:     ascorbic acid (Vitamin C) 500 mg tablet, Take 1 tablet (500 mg) by mouth once daily., Disp: 30 tablet, Rfl: 11    aspirin 81 mg EC tablet, Please take 1 tablet by mouth with food every day, same time each day please.  Thank you., Disp: 90 tablet, Rfl: 0    chlorthalidone (Hygroton) 25 mg tablet, Take 1 tablet (25 mg) by mouth once daily with breakfast., Disp: 90 tablet, Rfl: 0    cholecalciferol (Vitamin D-3) 50 MCG (2000 UT) tablet, Take 1 tablet (50 mcg) by mouth once daily., Disp: 30  tablet, Rfl: 11    cyanocobalamin (Vitamin B-12) 1,000 mcg tablet, Take 1 tablet (1,000 mcg) by mouth once daily., Disp: , Rfl:     famotidine (Pepcid) 20 mg tablet, Take 1 tablet (20 mg) by mouth once daily., Disp: , Rfl:     nitroglycerin (Nitrostat) 0.4 mg SL tablet, Place 1 tablet (0.4 mg) under the tongue every 5 minutes if needed for chest pain. May repeat every 5 minutes for up to 3 doses., Disp: 30 tablet, Rfl: 0    potassium chloride CR 10 mEq ER tablet, Please take 2 tablets by mouth with breakfast and a glass of water. Stay upright 30 minutes. Thank you., Disp: 180 tablet, Rfl: 0    propranolol LA (Inderal LA) 120 mg 24 hr capsule, Take 1 capsule (120 mg) by mouth once daily., Disp: 90 capsule, Rfl: 1    rosuvastatin (Crestor) 5 mg tablet, Please take 1 tablet by mouth at bedtime.  Thank you., Disp: 90 tablet, Rfl: 0    tadalafil (Cialis) 10 mg tablet, Take 1 tablet (10 mg) by mouth once daily as needed for erectile dysfunction., Disp: , Rfl:      Objective   Vitals:    10/23/24 1611   BP: 147/89   Pulse: 62   SpO2: 95%      Physical Exam  Constitutional: Alert, no acute distress  HEENT: Well-developed, anicteric  Neck: Supple  Chest: Unlabored respirations  Heart: Regular  Abdomen: Soft, nondistended, nontender no masses [] appreciated  Extremities: Warm and well perfused, no edema.  Resolved ecchymosis.  No mass   psychiatric: Oriented appropriately, mood and affect appropriate  Neuro: Intact    Assessment/Plan   Problem List Items Addressed This Visit    None    Soft tissue hematoma: Resolved.  Resume previous level of activity.  Postconcussion syndrome: Resolved.    Incidental finding of pancreatic IPMN.  I do not manage these.  Will require further evaluation by his PCP, consideration for pancreatic MRI  Overall doing very well.  No concerns for ongoing traumatic issues.  Can follow-up with me as needed.    Betty Lizarraga MD

## 2024-10-25 ENCOUNTER — TRANSCRIBE ORDERS (OUTPATIENT)
Dept: ADMINISTRATIVE | Age: 59
End: 2024-10-25

## 2024-10-25 ENCOUNTER — PATIENT OUTREACH (OUTPATIENT)
Dept: PRIMARY CARE | Facility: CLINIC | Age: 59
End: 2024-10-25
Payer: COMMERCIAL

## 2024-10-25 DIAGNOSIS — S33.5XXA LUMBAR SPRAIN, INITIAL ENCOUNTER: ICD-10-CM

## 2024-10-25 DIAGNOSIS — S30.0XXA CONTUSION OF BUTTOCK, INITIAL ENCOUNTER: Primary | ICD-10-CM

## 2024-10-30 ENCOUNTER — HOSPITAL ENCOUNTER (OUTPATIENT)
Dept: PHYSICAL THERAPY | Age: 59
Setting detail: THERAPIES SERIES
Discharge: HOME | End: 2024-10-30
Payer: COMMERCIAL

## 2024-10-30 PROCEDURE — 97140 MANUAL THERAPY 1/> REGIONS: CPT

## 2024-10-30 PROCEDURE — 97161 PT EVAL LOW COMPLEX 20 MIN: CPT

## 2024-10-30 PROCEDURE — 97110 THERAPEUTIC EXERCISES: CPT

## 2024-10-30 NOTE — PLAN OF CARE
63  Timed Code Treatment Minutes: 24 Minutes  Procedure Minutes: 39     Timed Activity Minutes Units   Ther Ex 11 1   Manual  13 1   Neuro Raz 0 0     Electronically signed by Chava Viramontes PT on 10/30/24 at 7:56 AM EDT           Please sign Physician's Certification and return to:   [facility] [place] [facility]  [address]  [place] [address]  Dept: [phone]  Dept Fax: [phone]  Loc: [phone]    Physician's Certification / Comments     Statement of Medical Necessity: Physical Therapy is both indicated and medically necessary as outlined in the POC to increase the likelihood of meeting the functionally related goals stated above.     Patient to be seen 2-3 times per week for 3-4 weeks  Certification period from 10/30/2024  to 11/23/24    If you have any questions or concerns, please don't hesitate to call.  Thank you for your referral.    I have reviewed this plan of care and certify a need for medically necessary rehabilitation services.    Physician Signature:__________________________________________________________  Date:  Please sign and return

## 2024-11-01 ENCOUNTER — HOSPITAL ENCOUNTER (OUTPATIENT)
Dept: PHYSICAL THERAPY | Age: 59
Setting detail: THERAPIES SERIES
Discharge: HOME | End: 2024-11-01
Payer: COMMERCIAL

## 2024-11-01 DIAGNOSIS — E78.2 MIXED HYPERLIPIDEMIA: ICD-10-CM

## 2024-11-01 DIAGNOSIS — R73.9 BORDERLINE HYPERGLYCEMIA: ICD-10-CM

## 2024-11-01 DIAGNOSIS — Z91.89 FRAMINGHAM CARDIAC RISK 10-20% IN NEXT 10 YEARS: Chronic | ICD-10-CM

## 2024-11-01 DIAGNOSIS — R94.31 ABNORMAL EKG: Chronic | ICD-10-CM

## 2024-11-01 DIAGNOSIS — R07.89 ATYPICAL CHEST PAIN: ICD-10-CM

## 2024-11-01 DIAGNOSIS — I10 ESSENTIAL HYPERTENSION: ICD-10-CM

## 2024-11-01 PROCEDURE — 97110 THERAPEUTIC EXERCISES: CPT

## 2024-11-01 PROCEDURE — 97140 MANUAL THERAPY 1/> REGIONS: CPT

## 2024-11-01 NOTE — PROGRESS NOTES
[place] [address] [place] [place] [address]  Phone: [phone]    Date: 2024  Patient: Matthias Winkler  : 1965   Confirmed: Yes  MRN: [id number]  Referring Provider: Anuj Zuluaga, DO30   Medical Diagnosis: Sprain of ligaments of cervical spine, initial encounter [S13.4XXA]  Sprain of ligaments of thoracic spine, initial encounter [S23.3XXA]  Sprain of ligaments of lumbar spine, initial encounter [S33.5XXA]       Treatment Diagnosis: pain, slightly decreased lumbar and cervical AROM, decreased cervical joint mobility, tender to palpation right lumbosacral triangle, left lumbosacral triangle, left gluteus darwin, Mid thoracic paraspinals and rotatores, decreased posture, and decreased flexiblity    Visit Information:  Insurance: Payor: [employer] / Plan: [employer] / Product Type: *No Product type* /   PT Visit Information  PT Insurance Information: [facility]  [id number]  Total # of Visits Approved: 12  Total # of Visits to Date: 2  Plan of Care/Certification Expiration Date: 24  No Show: 0  Canceled Appointment: 0  Progress Note Counter:   Referring Provider (secondary): 30    Subjective Information:  Subjective: Pt felt pretty good yesterday but feels tighter today due to the weather.  Pt has done some of his HEP.  HEP Compliance:  [] Good [x] Fair [] Poor [] Reports not doing due to:    Pain Screening  Patient Currently in Pain: Denies    Treatment:  Exercises:  Exercises  Exercise 1: Chin tucks 5\"x10  Exercise 2: Scap retractions 5\"x10  Exercise 3: LTR 5\"x10  Exercise 4: SKTC 30\"x3 BLE  Exercise 5: Mid back stretch w/ pball: 10\"x5 central, R, L  Exercise 6: Deep neck flexor endurance trainin\"x10  Exercise 7: B cervical rotation pain-free ROM 10\"x5  Exercise 8: Supine HS stretch 30\"x3 BLE  Exercise 9: Rows/Extensions w/ GTB x 15  Exercise 10: Lifting exercises*  Exercise 11: IYTs*  Exercise 12: Apollo Leg Press*  Exercise 13: Pushing/Pulling*  Exercise 20: HEP: 1-4

## 2024-11-04 RX ORDER — ASPIRIN 81 MG/1
TABLET ORAL
Qty: 90 TABLET | Refills: 0 | Status: SHIPPED | OUTPATIENT
Start: 2024-11-04

## 2024-11-04 RX ORDER — ROSUVASTATIN CALCIUM 5 MG/1
TABLET, COATED ORAL
Qty: 90 TABLET | Refills: 0 | Status: SHIPPED | OUTPATIENT
Start: 2024-11-04

## 2024-11-05 ENCOUNTER — HOSPITAL ENCOUNTER (OUTPATIENT)
Dept: PHYSICAL THERAPY | Age: 59
Setting detail: THERAPIES SERIES
Discharge: HOME | End: 2024-11-05
Payer: COMMERCIAL

## 2024-11-05 PROCEDURE — 97110 THERAPEUTIC EXERCISES: CPT

## 2024-11-07 ENCOUNTER — HOSPITAL ENCOUNTER (OUTPATIENT)
Dept: PHYSICAL THERAPY | Age: 59
Setting detail: THERAPIES SERIES
Discharge: HOME | End: 2024-11-07
Payer: COMMERCIAL

## 2024-11-07 PROCEDURE — 97110 THERAPEUTIC EXERCISES: CPT

## 2024-11-07 NOTE — PROGRESS NOTES
[place] [address] [place] [address]  Phone: [phone]    Date: 2024  Patient: Matthias Winkler  : 1965   Confirmed: Yes  MRN: [id number]  Referring Provider: Anuj Zuluaga DO30   Medical Diagnosis: Sprain of ligaments of cervical spine, initial encounter [S13.4XXA]  Sprain of ligaments of thoracic spine, initial encounter [S23.3XXA]  Sprain of ligaments of lumbar spine, initial encounter [S33.5XXA]       Treatment Diagnosis: pain, slightly decreased lumbar and cervical AROM, decreased cervical joint mobility, tender to palpation right lumbosacral triangle, left lumbosacral triangle, left gluteus darwin, Mid thoracic paraspinals and rotatores, decreased posture, and decreased flexiblity    Visit Information:  Insurance: Payor: [employer] / Plan: [employer] / Product Type: *No Product type* /   PT Visit Information  PT Insurance Information: [facility]  [id number]  Total # of Visits Approved: 12  Total # of Visits to Date: 4  Plan of Care/Certification Expiration Date: 24  No Show: 0  Canceled Appointment: 0  Progress Note Counter:   Referring Provider (secondary): 30    Subjective Information:  Subjective: Pt reports things have been alright, he has felt busy.  Pt c/o tightness in neck but no pain  HEP Compliance:  [] Good [x] Fair [] Poor [] Reports not doing due to:    Pain Screening  Patient Currently in Pain: Denies    Treatment:  Exercises:  Exercises  Exercise 2: Scap retractions 5\"x15  Exercise 3: LTR 5\"x10  Exercise 4: SKTC 30\"x3 BLE  Exercise 5: Mid back stretch w/ pball: 10\"x8 central, R, L  Exercise 6: Deep neck flexor endurance training:  10\"x10  Exercise 7: UT/LS 3 x 25\" percy  Exercise 8: Supine HS stretch 30\"x3 BLE  Exercise 11: IYTs (ER, IR, Scap, LT, Rows): 2 lb x 10  Exercise 20: HEP: cont current       *Indicates exercise, modality, or manual techniques to be initiated when appropriate    Objective Measures:      NT    Assessment:      Assessment: Pt

## 2024-11-12 ENCOUNTER — HOSPITAL ENCOUNTER (OUTPATIENT)
Dept: MRI IMAGING | Age: 59
Discharge: HOME | End: 2024-11-14
Payer: COMMERCIAL

## 2024-11-12 DIAGNOSIS — S33.5XXA LUMBAR SPRAIN, INITIAL ENCOUNTER: ICD-10-CM

## 2024-11-12 DIAGNOSIS — S30.0XXA CONTUSION OF BUTTOCK, INITIAL ENCOUNTER: ICD-10-CM

## 2024-11-12 PROCEDURE — 72148 MRI LUMBAR SPINE W/O DYE: CPT

## 2024-11-12 PROCEDURE — 72195 MRI PELVIS W/O DYE: CPT

## 2024-11-14 ENCOUNTER — HOSPITAL ENCOUNTER (OUTPATIENT)
Dept: PHYSICAL THERAPY | Age: 59
Setting detail: THERAPIES SERIES
Discharge: HOME | End: 2024-11-14
Payer: COMMERCIAL

## 2024-11-14 PROCEDURE — 97110 THERAPEUTIC EXERCISES: CPT

## 2024-11-14 NOTE — PROGRESS NOTES
Plan:  Frequency/Duration:  Plan  Plan Frequency: 2-3  Plan weeks: 3-4  Specific Instructions for Next Treatment: Introduce lifting / pushing and pulling exercises, gradually increase weight to tolerance  Current Treatment Recommendations: Strengthening, ROM, Balance training, Gait training, Stair training, Neuromuscular re-education, Manual, Pain management, Return to work related activity, Home exercise program, Safety education & training, Patient/Caregiver education & training, Equipment evaluation, education, & procurement, Modalities, Therapeutic activities  Modalities: Heat/Cold, Mechanical Traction, E-stim - manual, E-stim - unattended  Additional Comments: Start 2x/wk  Pt to continue current HEP.  See objective section for any therapeutic exercise changes, additions or modifications this date.    Therapy Time:      PT Individual Minutes  Time In: 0800  Time Out: 0839  Minutes: 39  Timed Code Treatment Minutes: 39 Minutes  Procedure Minutes: 0    Modality Time In Time Out Total Minutes Units    Ther ex (30461) [phone]     Electronically signed by Kahlil Her PT on 11/14/24 at 9:23 AM EST

## 2024-11-19 ENCOUNTER — HOSPITAL ENCOUNTER (OUTPATIENT)
Dept: PHYSICAL THERAPY | Age: 59
Setting detail: THERAPIES SERIES
Discharge: HOME | End: 2024-11-19
Payer: COMMERCIAL

## 2024-11-19 PROCEDURE — 97110 THERAPEUTIC EXERCISES: CPT

## 2024-11-19 NOTE — PROGRESS NOTES
[place] [address] [place] [address]  Phone: [phone]      Date: 2024  Patient: Matthias Winkler  : 1965   Confirmed: Yes  MRN: [id number]  Referring Provider: Anuj Zuluaga, DO30   Medical Diagnosis: Sprain of ligaments of cervical spine, initial encounter [S13.4XXA]  Sprain of ligaments of thoracic spine, initial encounter [S23.3XXA]  Sprain of ligaments of lumbar spine, initial encounter [S33.5XXA]       Treatment Diagnosis: pain, slightly decreased lumbar and cervical AROM, decreased cervical joint mobility, tender to palpation right lumbosacral triangle, left lumbosacral triangle, left gluteus darwin, Mid thoracic paraspinals and rotatores, decreased posture, and decreased flexiblity    Visit Information:  Insurance: Payor: [employer] / Plan: [employer] / Product Type: *No Product type* /   PT Visit Information  PT Insurance Information: [facility]  [id number]  Total # of Visits Approved: 12  Total # of Visits to Date: 6  Plan of Care/Certification Expiration Date: 24  No Show: 1  Canceled Appointment: 0  Progress Note Counter:   Referring Provider (secondary): 30    Subjective Information:  Subjective: Pt reported the back and neck are feeling great lately, however there is pain in the Rt heel with pt thinking it is his achillies.  He has been icing the heel as needed, but WBing for a long time increases the pain.  HEP Compliance:  [x] Good [] Fair [] Poor [] Reports not doing due to:    Pain Screening  Patient Currently in Pain: Denies    Treatment:  Exercises:  Exercises  Exercise 2: UBE: 2.5, 3'F/3'R  Exercise 3: apollo row: 4x10 (4 plates)  Exercise 4: apollo pull downs: 3 x 10 (4 plates)  Exercise 5: Bird-do\" pause x 10 percy  Exercise 7: Lateral cable pulls: 4 plates, 2 x 10-12 percy  Exercise 8: famer carry 30# KB 50ft x 3 laps percy  Exercise 9: Push/pull cart 100# x 5 laps  Exercise 10: Lifting 50# box from floor to waist height 2 x 5  Exercise 20: HEP:

## 2024-11-21 ENCOUNTER — HOSPITAL ENCOUNTER (OUTPATIENT)
Dept: PHYSICAL THERAPY | Age: 59
Setting detail: THERAPIES SERIES
Discharge: HOME | End: 2024-11-21
Payer: COMMERCIAL

## 2024-11-21 ENCOUNTER — PATIENT OUTREACH (OUTPATIENT)
Dept: PRIMARY CARE | Facility: CLINIC | Age: 59
End: 2024-11-21
Payer: COMMERCIAL

## 2024-11-21 PROCEDURE — 97110 THERAPEUTIC EXERCISES: CPT

## 2024-11-21 NOTE — PROGRESS NOTES
[place] [address] [place] [address]  Phone: [phone]    [] Certification  [] Recertification []  Plan of Care  [] Progress Note [x] Discharge      Referring Provider: Anuj Zuluaga DO     From:  Kahlil Her, PT, DPT  Patient: Matthias Winkler (59 y.o. male) : 1965 Date: 2024  Medical Diagnosis: Sprain of ligaments of cervical spine, initial encounter [S13.4XXA]  Sprain of ligaments of thoracic spine, initial encounter [S23.3XXA]  Sprain of ligaments of lumbar spine, initial encounter [S33.5XXA]       Treatment Diagnosis: pain, slightly decreased lumbar and cervical AROM, decreased cervical joint mobility, tender to palpation right lumbosacral triangle, left lumbosacral triangle, left gluteus darwin, Mid thoracic paraspinals and rotatores, decreased posture, and decreased flexiblity     Plan of Care/Certification Expiration Date: 24   Progress Report Period from: 10/30/2024 to 2024    Visits to Date: 7 No Show: 1 Cancelled Appts: 0    OBJECTIVE:   Long Term Goals -    Goals Current/ Discharge status Status   Long Term Goal 1: The pt will have decreased pain </= 0/10 in order to increase ease with ADL's and work activities LTG 1 Current Status:: : 0/10 pain with work activities   Met   Long Term Goal 2: The pt will demo improved R cervical rotation and R lumbar sidebending pain-free AROM in order to increase ease of ADL's LTG 2 Current Status:: : Rt cervical rotation 75 degees, R lumbar SBing 100% no pain   Met   Long Term Goal 3: The pt will have a decrease in NDI and ALEXANDRE score >/=8 points in order to increase functional activity tolerance  ALEXANDRE: 0/50  NDI: 1/50   Met   Long Term Goal 4: The pt will demo lifting 100 pounds w/ no c/o pain and lift from the ground to his waist. LTG 4 Current Status:: : Pt able to lift 50# from floor to waist without pain. Pt stated he does not lift 100# at work   Deferred   Long Term Goal 5: The pt will demo lifting 
2 Current Status:: 11/19: Rt cervical rotation 75 degees, R lumbar SBing 100% no pain     LTG 4 Current Status:: 11/19: Pt able to lift 50# from floor to waist without pain. Pt stated he does not lift 100# at work  LTG 5 Current Status:: 11/19: Pt able to lift 50# from floor to waist without pain. Pt stated he does not lift 100# at work  LTG 6 Current Status:: 11/19: Pt able to push/pull 100lbs without pain       Assessment:      Assessment: Pt meeting all PT goals at this time. Pt reports neck and back pain has resolved. Pt demo's good physical activity tolerance. Pt demo's independence with strengthening HEP with good tolerance/no pain. Pt to D/C from PT services today.  Treatment Diagnosis: pain, slightly decreased lumbar and cervical AROM, decreased cervical joint mobility, tender to palpation right lumbosacral triangle, left lumbosacral triangle, left gluteus darwin, Mid thoracic paraspinals and rotatores, decreased posture, and decreased flexiblity  Therapy Prognosis: Excellent       Post-Pain Assessment:       Pain Rating (0-10 pain scale):  0 /10   Location and pain description same as pre-treatment unless indicated.   Action: [x] NA   [] Perform HEP  [] Meds as prescribed  [] Modalities as prescribed   [] Call Physician     GOALS   Patient Goal(s): Patient Goals : Get back close to PLOF    Long Term Goals Completed by   Goal Status   LTG 1 The pt will have decreased pain </= 0/10 in order to increase ease with ADL's and work activities Met   LTG 2 The pt will demo improved R cervical rotation and R lumbar sidebending pain-free AROM in order to increase ease of ADL's Met   LTG 3 The pt will have a decrease in NDI and ALEXANDRE score >/=8 points in order to increase functional activity tolerance In progress   LTG 4 The pt will demo lifting 100 pounds w/ no c/o pain and lift from the ground to his waist. Deferred   LTG 5 The pt will demo lifting 100 pounds w/ no c/o pain and lift from the ground to his waist and

## 2024-12-09 ENCOUNTER — APPOINTMENT (OUTPATIENT)
Dept: PRIMARY CARE | Facility: CLINIC | Age: 59
End: 2024-12-09
Payer: COMMERCIAL

## 2024-12-12 ENCOUNTER — APPOINTMENT (OUTPATIENT)
Dept: PRIMARY CARE | Facility: CLINIC | Age: 59
End: 2024-12-12
Payer: COMMERCIAL

## 2025-01-08 DIAGNOSIS — I10 ESSENTIAL HYPERTENSION: ICD-10-CM

## 2025-01-08 DIAGNOSIS — E87.6 HYPOKALEMIA: ICD-10-CM

## 2025-01-15 ENCOUNTER — APPOINTMENT (OUTPATIENT)
Dept: PRIMARY CARE | Facility: CLINIC | Age: 60
End: 2025-01-15
Payer: COMMERCIAL

## 2025-01-15 VITALS
SYSTOLIC BLOOD PRESSURE: 135 MMHG | HEIGHT: 69 IN | DIASTOLIC BLOOD PRESSURE: 85 MMHG | OXYGEN SATURATION: 95 % | BODY MASS INDEX: 27.99 KG/M2 | WEIGHT: 189 LBS | HEART RATE: 61 BPM

## 2025-01-15 DIAGNOSIS — E78.2 MIXED HYPERLIPIDEMIA: ICD-10-CM

## 2025-01-15 DIAGNOSIS — R73.9 BORDERLINE HYPERGLYCEMIA: ICD-10-CM

## 2025-01-15 DIAGNOSIS — I10 ESSENTIAL HYPERTENSION: ICD-10-CM

## 2025-01-15 DIAGNOSIS — E66.3 OVERWEIGHT (BMI 25.0-29.9): ICD-10-CM

## 2025-01-15 DIAGNOSIS — E55.9 VITAMIN D DEFICIENCY: ICD-10-CM

## 2025-01-15 DIAGNOSIS — G43.009 MIGRAINE WITHOUT AURA AND WITHOUT STATUS MIGRAINOSUS, NOT INTRACTABLE: ICD-10-CM

## 2025-01-15 DIAGNOSIS — N52.9 ERECTILE DYSFUNCTION, UNSPECIFIED ERECTILE DYSFUNCTION TYPE: ICD-10-CM

## 2025-01-15 DIAGNOSIS — D64.9 ANEMIA, NORMOCYTIC NORMOCHROMIC: ICD-10-CM

## 2025-01-15 DIAGNOSIS — R35.1 NOCTURIA: ICD-10-CM

## 2025-01-15 DIAGNOSIS — Z91.89 FRAMINGHAM CARDIAC RISK 10-20% IN NEXT 10 YEARS: Chronic | ICD-10-CM

## 2025-01-15 DIAGNOSIS — E87.6 HYPOKALEMIA: ICD-10-CM

## 2025-01-15 DIAGNOSIS — M72.2 PLANTAR FASCIITIS: Primary | Chronic | ICD-10-CM

## 2025-01-15 DIAGNOSIS — S06.0X1S CONCUSSION WITH LOSS OF CONSCIOUSNESS OF 30 MINUTES OR LESS, SEQUELA (CMS-HCC): ICD-10-CM

## 2025-01-15 DIAGNOSIS — F51.04 CHRONIC INSOMNIA: ICD-10-CM

## 2025-01-15 DIAGNOSIS — K21.9 GASTROESOPHAGEAL REFLUX DISEASE, UNSPECIFIED WHETHER ESOPHAGITIS PRESENT: ICD-10-CM

## 2025-01-15 DIAGNOSIS — K86.89 PANCREATIC MASS (HHS-HCC): Chronic | ICD-10-CM

## 2025-01-15 PROBLEM — R94.31 ABNORMAL EKG: Chronic | Status: ACTIVE | Noted: 2025-01-15

## 2025-01-15 PROCEDURE — 3075F SYST BP GE 130 - 139MM HG: CPT | Performed by: INTERNAL MEDICINE

## 2025-01-15 PROCEDURE — 3008F BODY MASS INDEX DOCD: CPT | Performed by: INTERNAL MEDICINE

## 2025-01-15 PROCEDURE — 1036F TOBACCO NON-USER: CPT | Performed by: INTERNAL MEDICINE

## 2025-01-15 PROCEDURE — 3079F DIAST BP 80-89 MM HG: CPT | Performed by: INTERNAL MEDICINE

## 2025-01-15 PROCEDURE — 99214 OFFICE O/P EST MOD 30 MIN: CPT | Performed by: INTERNAL MEDICINE

## 2025-01-15 RX ORDER — ROSUVASTATIN CALCIUM 5 MG/1
TABLET, COATED ORAL
Qty: 90 TABLET | Refills: 1 | Status: SHIPPED | OUTPATIENT
Start: 2025-01-15

## 2025-01-15 RX ORDER — TADALAFIL 5 MG/1
5 TABLET ORAL DAILY
Qty: 90 TABLET | Refills: 1 | Status: SHIPPED | OUTPATIENT
Start: 2025-01-15 | End: 2025-07-14

## 2025-01-15 RX ORDER — POTASSIUM CHLORIDE 750 MG/1
TABLET, FILM COATED, EXTENDED RELEASE ORAL
Qty: 180 TABLET | Refills: 1 | Status: SHIPPED | OUTPATIENT
Start: 2025-01-15

## 2025-01-15 NOTE — PATIENT INSTRUCTIONS
Thank you much for coming.  It is nice to see you again.    Your blood pressure initially was elevated, but after resting, it improved some.  Please make sure to stay physically active.  Lots of brisk walking, very good for blood pressure control.  Also very good for mood and energy.    Please make sure to drink lots of fluids throughout the day.  Thank you for avoiding salt.  Very good for blood pressure control and keeping your kidneys healthy.    Please check your blood pressure routinely in the evening, in a calm and resting state.  Make sure that you are in a seated position, feet flat on the floor, use the backrest of the chair.  Please call me if your heart rate at rest is 90 or higher.  Please call me if the first number of your blood pressure is persistently 145 or higher at rest.  Please call me if the second number of your blood pressure is persistently 88 or higher at rest.  I can make some adjustments over the telephone until I see you again.    I do understand that you have to urinate sometimes 4 or 5 times each night.  This may need more attention in the future.  Otherwise, your urine in October was normal.  Let us see if you can take your TADALAFIL/CIALIS 5 mg every day.  That may help with your urination as well.    I do understand you have been having trouble with your RIGHT FOOT.  Let us have a PODIATRIST take a look at you.  In the meantime, wear supportive shoes.    Please come back in May, your birth month.  Do some FASTING laboratory examinations, then see me soon after.  We will do your yearly PHYSICAL examination at that time.    Please restart trial of MELATONIN 5 mg.  Take with supper every evening.  Go ahead and do your tea at bedtime.  The combination might be enough to give you better sleep.  If not, please call me after 1 week.    Again, thank you very much for coming.  Please continue to take care of yourself and each other, and please continue to pray for our recovery from this  pandemic.  Congratulations on your wedding!  Take care and God bless.  I hope you had a good Beena, and I do wish you a happy new year.    You have not needed to take NITROGLYCERIN.  Please discard this medication.  This way, it will not interfere with your use of TADALAFIL.            0  Return in 4 months.  40 minutes please.  FASTING laboratory examinations to be done outside, then see me for yearly PHYSICAL examination.  Reassess hemodynamics, cardiovascular risk, review preventive strategies, coordinate with podiatry, cardiology.  Consider options regarding weight management.            0

## 2025-01-15 NOTE — PROGRESS NOTES
"Subjective   Patient ID: Figueroa Cabral is a 59 y.o. male who presents for Follow-up (Patient presented today for a 2 month follow up./).    HPI   The patient states that he is doing well.  He does have a list of concerns, the first he mentions is his INSOMNIA which he has always had, but apparently has gotten worse over the past 2 months or so.  He states that he tried MELATONIN 10 mg in the past once, but he woke up groggy and never pursued this regimen.  He does take some soothing tea before going to bed, but he goes to lay down and \"my mind just does not stop.  I will be in a dreamlike state, but it would be all weird.\"    Furthermore, he does get up 4-5 times each night with sleep interruption, sometimes to go to the restroom, sometimes because his cats ask for attention, he states.  He does work through the day and does not need to take a nap.  No mention of excessive snoring.    Still, he states that he does not feel depressive.  He may be a little anxious, especially since he lost his ex-wife December 25, rather unexpectedly.  He is also planning his upcoming marriage this coming May.    Otherwise, no headache, blurred vision, no diplopia, no dysphagia.  Continues to want to improve quality of life, and does not wish harm to self or others.    Also states that he has been having nagging RIGHT FOOT PAIN, which he suspects to be PLANTAR FASCIITIS.  He has been doing over-the-counter regimens, foot exercises, ice, proper foot gear and support.    Otherwise, compliant with medications, tolerating regimens.  Has been back to exercising regularly and tries to eat more sensibly.  Blood pressure usually running 130s over 80s, sometimes higher.  Does not necessarily take an salt.  No bilateral edema.    No particular cough, no particular sputum production.  No dysuria, flank, suprapubic pain.  Appetite preserved, with no nausea, vomiting, abdominal distress.  No diarrhea, no constipation.  No apparent blood in " "stool.  No apparent weight loss.  No skin changes, rashes, pruritus, jaundice.  No easy bruisability.  ENDOCRINE with no polyuria, polydipsia, polyphagia.  No blurred vision.  No skin, hair, nail changes.  No dramatic weight loss or weight gain.         Review of Systems  Review of systems as in history of present illness, and otherwise, reviewed separately as well, and was unremarkable/negative/noncontributory.        Objective   /85 (BP Location: Right arm, Patient Position: Sitting, BP Cuff Size: Large adult)   Pulse 61   Ht 1.753 m (5' 9\")   Wt 85.7 kg (189 lb)   SpO2 95%   BMI 27.91 kg/m²     Physical Exam  In good spirits.  Not in distress or diaphoresis.  Alert, oriented x 3.  Amiable.  Not unkempt.  Moderately built, muscular.  Remaining independent and capable.  Continues to want to improve quality of life, does not wish harm to self or others.  Appropriate.    HEAD pink palpebral conjunctivae, anicteric sclerae.  NECK supple, no apparent jugular venous distention.  CARDIOVASCULAR not in distress or diaphoresis.  No bipedal edema.  LUNGS not in distress or diaphoresis.  Not using accessory muscles.  ABDOMEN soft, nontender.  BACK no costovertebral angle tenderness.  EXTREMITIES no clubbing, no cyanosis.  NEURO no facial asymmetry.  No apparent cranial nerve deficits.  Romberg negative.  Ambulating without need of assistance.  No apparent focal weakness.  No tremors.  PSYCH receptive, appropriate, and eager to maintain and improve quality of life.        LABORATORY results reviewed from October, with urine negative.  Liver and kidney function preserved.  Hemoglobin A1c 4.6, body mass index 27.91.  LDL cholesterol 77, triglycerides 214, preserved liver function.  ASCVD risk 7.7%.  Amylase, lipase, thyroid, magnesium, all coming back good.  History of anemia, nonsustained.  Indexes negative.          Assessment/Plan   Diagnoses and all orders for this visit:  Plantar " fasciitis  Comments:  RIGHT  Orders:  -     Referral to Podiatry; Future  -     Follow Up In Primary Care - Established; Future  -     Comprehensive Metabolic Panel; Future  Chronic insomnia  -     Follow Up In Primary Care - Established; Future  -     Comprehensive Metabolic Panel; Future  -     TSH with reflex to Free T4 if abnormal; Future  Nocturia  -     tadalafil (Cialis) 5 mg tablet; Take 1 tablet (5 mg) by mouth once daily.  -     Follow Up In Primary Care - Established; Future  -     Comprehensive Metabolic Panel; Future  -     Urinalysis with Reflex Culture and Microscopic; Future  Erectile dysfunction, unspecified erectile dysfunction type  -     tadalafil (Cialis) 5 mg tablet; Take 1 tablet (5 mg) by mouth once daily.  -     Follow Up In Primary Care - hospitals; Future  -     CBC and Auto Differential; Future  -     Comprehensive Metabolic Panel; Future  -     TSH with reflex to Free T4 if abnormal; Future  -     Magnesium; Future  -     Urinalysis with Reflex Culture and Microscopic; Future  Essential hypertension  -     Follow Up In Primary Care - Established  -     potassium chloride CR 10 mEq ER tablet; Please take 2 tablets by mouth with breakfast and a glass of water. Stay upright 30 minutes. Thank you.  -     Follow Up In Primary Care - hospitals; Future  -     CBC and Auto Differential; Future  -     Comprehensive Metabolic Panel; Future  -     TSH with reflex to Free T4 if abnormal; Future  -     Magnesium; Future  -     Urinalysis with Reflex Culture and Microscopic; Future  Mixed hyperlipidemia  -     rosuvastatin (Crestor) 5 mg tablet; Please take 1 tablet by mouth at bedtime.  Thank you.  -     Follow Up In Primary Care - hospitals; Future  -     Comprehensive Metabolic Panel; Future  -     Lipid Panel; Future  Borderline hyperglycemia  -     Follow Up In Primary Care - hospitals; Future  -     Comprehensive Metabolic Panel; Future  -     Urinalysis with Reflex Culture and  Microscopic; Future  -     Hemoglobin A1C; Future  [place] cardiac risk 10-20% in next 10 years  Comments:  12.6% 07/2024  Orders:  -     rosuvastatin (Crestor) 5 mg tablet; Please take 1 tablet by mouth at bedtime.  Thank you.  -     Follow Up In Primary Care - Established; Future  -     Comprehensive Metabolic Panel; Future  -     Lipid Panel; Future  -     Hemoglobin A1C; Future  Hypokalemia  -     potassium chloride CR 10 mEq ER tablet; Please take 2 tablets by mouth with breakfast and a glass of water. Stay upright 30 minutes. Thank you.  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  Orders:  -     Follow Up In Primary Care - Established; Future  -     CBC and Auto Differential; Future  -     Comprehensive Metabolic Panel; Future  -     Lipase; Future  Gastroesophageal reflux disease, unspecified whether esophagitis present  -     Follow Up In Primary Care - Established; Future  -     CBC and Auto Differential; Future  Overweight (BMI 25.0-29.9)  -     Follow Up In Primary Care - Established; Future  -     Comprehensive Metabolic Panel; Future  -     TSH with reflex to Free T4 if abnormal; Future  Migraine without aura and without status migrainosus, not intractable  -     Follow Up In Primary Care - Established; Future  -     CBC and Auto Differential; Future  -     Comprehensive Metabolic Panel; Future  -     TSH with reflex to Free T4 if abnormal; Future  -     Magnesium; Future  Concussion with loss of consciousness of 30 minutes or less, sequela  (CMS-[facility])  Comments:  09/2024  Orders:  -     Follow Up In Primary Care - Established; Future  -     CBC and Auto Differential; Future  -     Comprehensive Metabolic Panel; Future  Vitamin D deficiency  -     Follow Up In Primary Care - Established; Future  -     Comprehensive Metabolic Panel; Future  -     Vitamin D 25-Hydroxy,Total (for eval of Vitamin D levels); Future       Thank you much for coming.  It is nice to see you again.    Your blood pressure initially was elevated, but after resting, it improved some.  Please make sure to stay physically active.  Lots of brisk walking, very good for blood pressure control.  Also very good for mood and energy.    Please make sure to drink lots of fluids throughout the day.  Thank you for avoiding salt.  Very good for blood pressure control and keeping your kidneys healthy.    Please check your blood pressure routinely in the evening, in a calm and resting state.  Make sure that you are in a seated position, feet flat on the floor, use the backrest of the chair.  Please call me if your heart rate at rest is 90 or higher.  Please call me if the first number of your blood pressure is persistently 145 or higher at rest.  Please call me if the second number of your blood pressure is persistently 88 or higher at rest.  I can make some adjustments over the telephone until I see you again.    I do understand that you have to urinate sometimes 4 or 5 times each night.  This may need more attention in the future.  Otherwise, your urine in October was normal.  Let us see if you can take your TADALAFIL/CIALIS 5 mg every day.  That may help with your urination as well.    I do understand you have been having trouble with your RIGHT FOOT.  Let us have a PODIATRIST take a look at you.  In the meantime, wear supportive shoes.    Please come back in May, your birth month.  Do some FASTING laboratory examinations, then see me soon after.  We will do your yearly PHYSICAL examination at  that time.    Please restart trial of MELATONIN 5 mg.  Take with supper every evening.  Go ahead and do your tea at bedtime.  The combination might be enough to give you better sleep.  If not, please call me after 1 week.    Again, thank you very much for coming.  Please continue to take care of yourself and each other, and please continue to pray for our recovery from this pandemic.  Congratulations on your wedding!  Take care and God bless.  I hope you had a good Beena, and I do wish you a happy new year.    You have not needed to take NITROGLYCERIN.  Please discard this medication.  This way, it will not interfere with your use of TADALAFIL.            0  Return in 4 months.  40 minutes please.  FASTING laboratory examinations to be done outside, then see me for yearly PHYSICAL examination.  Reassess hemodynamics, cardiovascular risk, review preventive strategies, coordinate with podiatry, cardiology.  Consider options regarding weight management.            0

## 2025-01-17 RX ORDER — POTASSIUM CHLORIDE 750 MG/1
TABLET, FILM COATED, EXTENDED RELEASE ORAL
Qty: 180 TABLET | Refills: 0 | OUTPATIENT
Start: 2025-01-17

## 2025-02-09 DIAGNOSIS — I10 ESSENTIAL HYPERTENSION: ICD-10-CM

## 2025-02-10 DIAGNOSIS — E78.2 MIXED HYPERLIPIDEMIA: ICD-10-CM

## 2025-02-10 DIAGNOSIS — R73.9 BORDERLINE HYPERGLYCEMIA: ICD-10-CM

## 2025-02-10 DIAGNOSIS — R07.89 ATYPICAL CHEST PAIN: ICD-10-CM

## 2025-02-10 DIAGNOSIS — R94.31 ABNORMAL EKG: Chronic | ICD-10-CM

## 2025-02-10 DIAGNOSIS — Z91.89 FRAMINGHAM CARDIAC RISK 10-20% IN NEXT 10 YEARS: Chronic | ICD-10-CM

## 2025-02-10 DIAGNOSIS — I10 ESSENTIAL HYPERTENSION: ICD-10-CM

## 2025-02-19 RX ORDER — ASPIRIN 81 MG/1
TABLET ORAL
Qty: 90 TABLET | Refills: 0 | Status: SHIPPED | OUTPATIENT
Start: 2025-02-19

## 2025-02-19 RX ORDER — CHLORTHALIDONE 25 MG/1
25 TABLET ORAL
Qty: 90 TABLET | Refills: 0 | Status: SHIPPED | OUTPATIENT
Start: 2025-02-19

## 2025-02-26 ENCOUNTER — OFFICE VISIT (OUTPATIENT)
Dept: PODIATRY | Facility: CLINIC | Age: 60
End: 2025-02-26
Payer: COMMERCIAL

## 2025-02-26 VITALS
SYSTOLIC BLOOD PRESSURE: 157 MMHG | HEART RATE: 61 BPM | DIASTOLIC BLOOD PRESSURE: 97 MMHG | WEIGHT: 183 LBS | OXYGEN SATURATION: 96 % | BODY MASS INDEX: 27.11 KG/M2 | HEIGHT: 69 IN | TEMPERATURE: 97.5 F | RESPIRATION RATE: 16 BRPM

## 2025-02-26 DIAGNOSIS — M21.862 ACQUIRED POSTERIOR EQUINUS OF BOTH LOWER EXTREMITIES: ICD-10-CM

## 2025-02-26 DIAGNOSIS — M21.861 ACQUIRED POSTERIOR EQUINUS OF BOTH LOWER EXTREMITIES: ICD-10-CM

## 2025-02-26 DIAGNOSIS — M72.2 PLANTAR FASCIITIS: Chronic | ICD-10-CM

## 2025-02-26 DIAGNOSIS — M79.671 PAIN IN RIGHT FOOT: Primary | ICD-10-CM

## 2025-02-26 PROCEDURE — 99213 OFFICE O/P EST LOW 20 MIN: CPT | Performed by: PODIATRIST

## 2025-02-26 NOTE — PROGRESS NOTES
Initial Podiatric Office Visit:    Chief Complaint:   Chief Complaint   Patient presents with    Plantar Fasciitis           HPI:  This 59 y.o. male with PMH indicated below presents complaining of chronic pain to the right heel. Patient states that around September of last year he had an accident at work which required him to be sedentary for roughly a month.  Patient returned to work in October and noted to have increase in activity which caused pain in his right heel.  Patient states that he had pain after resting and first time in the morning with first up.  Patient tried icing, occasional stretching, over-the-counter inserts and ibuprofen with some relief.  Patient states that since the incident his pain has subsided and rates it a 2 out of 10 on the pain scale.  He denies any trauma.  He does not have any x-rays.  Patient states he does feel some tightness in his posterior right calf.  He denies any constitutional symptoms at this time.  No other pedal complaints.      PCP:  Supa Montero MD:    [facility]  Past Medical History:   Diagnosis Date    HLD (hyperlipidemia)     Migraine, unspecified, not intractable, without status migrainosus     Migraines    Pain in unspecified knee     Knee pain    Personal history of other diseases of the circulatory system     History of hypertension    Personal history of other diseases of the digestive system     History of gastroesophageal reflux (GERD)    Personal history of other specified conditions     History of multiple pulmonary nodules   :    MEDICATIONS  Current Outpatient Medications   Medication Sig Dispense Refill    ascorbic acid (Vitamin C) 500 mg tablet Take 1 tablet (500 mg) by mouth once daily. 30 tablet 11    aspirin 81 mg EC tablet PLEASE TAKE 1 TABLET BY MOUTH WITH FOOD EVERY DAY, SAME TIME EACH DAY PLEASE. THANK YOU. 90 tablet 0    chlorthalidone (Hygroton) 25 mg tablet TAKE 1 TABLET (25 MG) BY MOUTH ONCE DAILY WITH BREAKFAST. 90 tablet 0     cholecalciferol (Vitamin D-3) 50 MCG (2000 UT) tablet Take 1 tablet (50 mcg) by mouth once daily. 30 tablet 11    cyanocobalamin (Vitamin B-12) 1,000 mcg tablet Take 1 tablet (1,000 mcg) by mouth once daily.      famotidine (Pepcid) 20 mg tablet Take 1 tablet (20 mg) by mouth once daily.      potassium chloride CR 10 mEq ER tablet Please take 2 tablets by mouth with breakfast and a glass of water. Stay upright 30 minutes. Thank you. 180 tablet 1    propranolol LA (Inderal LA) 120 mg 24 hr capsule Take 1 capsule (120 mg) by mouth once daily. 90 capsule 1    rosuvastatin (Crestor) 5 mg tablet Please take 1 tablet by mouth at bedtime.  Thank you. 90 tablet 1    tadalafil (Cialis) 5 mg tablet Take 1 tablet (5 mg) by mouth once daily. 90 tablet 1     No current facility-administered medications for this visit.   :  No Known Allergies:  Past Surgical History:   Procedure Laterality Date    COLONOSCOPY W/ POLYPECTOMY  2019    Complete Colonoscopy For Polyp Removal    OTHER SURGICAL HISTORY  2019    Finger surgical procedure    OTHER SURGICAL HISTORY  2019    Wrist surgery     No family history on file.:  Social History     Socioeconomic History    Marital status: Significant Other   Tobacco Use    Smoking status: Former     Current packs/day: 0.00     Types: Cigarettes     Quit date:      Years since quittin.    Smokeless tobacco: Never   Vaping Use    Vaping status: Never Used   Substance and Sexual Activity    Alcohol use: Yes    Drug use: Never     Social Drivers of Health     Financial Resource Strain: Low Risk  (2024)    Overall Financial Resource Strain (CARDIA)     Difficulty of Paying Living Expenses: Not hard at all   Food Insecurity: No Food Insecurity (2024)    Hunger Vital Sign     Worried About Running Out of Food in the Last Year: Never true     Ran Out of Food in the Last Year: Never true   Transportation Needs: No Transportation Needs (2024)    PRAPARE -  Transportation     Lack of Transportation (Medical): No     Lack of Transportation (Non-Medical): No   Physical Activity: Sufficiently Active (9/11/2024)    Exercise Vital Sign     Days of Exercise per Week: 7 days     Minutes of Exercise per Session: 50 min   Stress: No Stress Concern Present (9/11/2024)    [place] [place] [employer] - Occupational Stress Questionnaire     Feeling of Stress : Not at all   Social Connections: Moderately Isolated (9/11/2024)    Social Connection and Isolation Panel [NHANES]     Frequency of Communication with Friends and Family: More than three times a week     Frequency of Social Gatherings with Friends and Family: More than three times a week     Attends Anabaptism Services: Never     Active Member of Clubs or Organizations: No     Attends Club or Organization Meetings: Never     Marital Status: Living with partner   Intimate Partner Violence: Not At Risk (9/11/2024)    Humiliation, Afraid, Rape, and Kick questionnaire     Fear of Current or Ex-Partner: No     Emotionally Abused: No     Physically Abused: No     Sexually Abused: No   Housing Stability: Low Risk  (9/12/2024)    Housing Stability Vital Sign     Unable to Pay for Housing in the Last Year: No     Number of Times Moved in the Last Year: 0     Homeless in the Last Year: No       REVIEW OF SYSTEMS    GENERAL: No weight loss, malaise or fevers.  HEENT: Negative for frequent or significant headaches,   RESPIRATORY: Negative for cough, wheezing or shortness of breath.  CARDIOVASCULAR: Negative for chest pain, leg swelling or palpitations.  GI: Negative for abdominal discomfort,  MUSCULOSKELETAL: No back pain      SKIN: Negative for lesions, rash, and itching.  NEURO: No numbess, tingling or burning in feet      Physical Exam:       Patient is alert and oriented x 3 in NAD    Vascular:   2/4 palpable Dorsalis Pedis and Posterior Tibial Pulses B/L  Capillary Fill time < 3 seconds to digits 1-5 B/L  Skin  temperature warm to warm tibial tuberosity to the digits B/L  No  edema.  No varicosities    Neurological:   Intact light touch/epicritic sensation B/L   Intact sharp/dull sensations  Intact protective sensation, no clonus b/l.      Dermatological:   Skin appears well hydrated and supple. good color, texture, turgor. No open lesions present. Hyperkeratosis not noted. Webspaces clean and dry 1-4 b/l. Nails 1-5 b/l appear normal.    Musculoskeletal/Orthopaedic:   General foot morphology: Slight increase medial longitudinal arch  +5/5 muscle strength Dorsiflexion, Plantarflexion, Inversion, Eversion B/L  ROM of the 1st MTPJ is normal without pain or crepitus b/l.  ROM of the MTJ/STJ is full without pain or crepitus b/l.  Ankle joint ROM is decreased  B/L.  Pain to palpation of insertion of plantar fascia right foot medial calcaneal tubercle.        ASSESSMENT:   1. Pain in right foot    2. Plantar fasciitis    3. Acquired posterior equinus of both lower extremities              Plan:    - Initial Office Visit   - Etiology and treatment options were discussed with the patient.  -Patient examined evaluated  - Discussed with patient causes and treatment of plantars fasciitis.  - Patient to continue icing, manual massage therapy, over-the-counter anti-inflammatories, stretching  - Packet for home physical therapy given to patient.  - Patient to refrain from walking barefoot and to continue using over-the-counter inserts.  - Patient to follow-up as needed    Barby Mercedes DPM    A total of 30 minutes was spent in formulation of this note, review of charts, labs,  imaging with a minimum of 50% of the time spent in face to face with with the patient.  All questions and concerns were answered to the patients satisfaction.     Off note, use of vocal Dragon dictation system was used to dictate this document.  All proper spelling and grammatical errors may not have been corrected prior to final submission.

## 2025-03-11 DIAGNOSIS — N52.9 ERECTILE DYSFUNCTION, UNSPECIFIED ERECTILE DYSFUNCTION TYPE: ICD-10-CM

## 2025-03-12 RX ORDER — TADALAFIL 10 MG/1
TABLET ORAL
Qty: 12 TABLET | Refills: 1 | Status: SHIPPED | OUTPATIENT
Start: 2025-03-12

## 2025-04-21 DIAGNOSIS — R94.31 ABNORMAL EKG: Chronic | ICD-10-CM

## 2025-04-21 DIAGNOSIS — E87.6 HYPOKALEMIA: ICD-10-CM

## 2025-04-21 DIAGNOSIS — I10 ESSENTIAL HYPERTENSION: ICD-10-CM

## 2025-04-21 DIAGNOSIS — E78.2 MIXED HYPERLIPIDEMIA: ICD-10-CM

## 2025-04-21 DIAGNOSIS — Z91.89 FRAMINGHAM CARDIAC RISK 10-20% IN NEXT 10 YEARS: Chronic | ICD-10-CM

## 2025-04-21 DIAGNOSIS — R07.89 ATYPICAL CHEST PAIN: ICD-10-CM

## 2025-04-21 DIAGNOSIS — G43.009 MIGRAINE WITHOUT AURA AND WITHOUT STATUS MIGRAINOSUS, NOT INTRACTABLE: ICD-10-CM

## 2025-04-21 DIAGNOSIS — R73.9 BORDERLINE HYPERGLYCEMIA: ICD-10-CM

## 2025-04-22 DIAGNOSIS — G43.009 MIGRAINE WITHOUT AURA AND WITHOUT STATUS MIGRAINOSUS, NOT INTRACTABLE: ICD-10-CM

## 2025-04-22 DIAGNOSIS — I10 ESSENTIAL HYPERTENSION: ICD-10-CM

## 2025-04-25 RX ORDER — ASPIRIN 81 MG/1
TABLET ORAL
Qty: 90 TABLET | Refills: 1 | Status: SHIPPED | OUTPATIENT
Start: 2025-04-25

## 2025-04-25 RX ORDER — CHLORTHALIDONE 25 MG/1
25 TABLET ORAL
Qty: 90 TABLET | Refills: 0 | Status: SHIPPED | OUTPATIENT
Start: 2025-04-25

## 2025-04-25 RX ORDER — PROPRANOLOL HYDROCHLORIDE 120 MG/1
120 CAPSULE, EXTENDED RELEASE ORAL DAILY
Qty: 90 CAPSULE | Refills: 1 | Status: SHIPPED | OUTPATIENT
Start: 2025-04-25

## 2025-04-25 RX ORDER — POTASSIUM CHLORIDE 750 MG/1
TABLET, FILM COATED, EXTENDED RELEASE ORAL
Qty: 180 TABLET | Refills: 0 | Status: SHIPPED | OUTPATIENT
Start: 2025-04-25

## 2025-04-27 RX ORDER — PROPRANOLOL HYDROCHLORIDE 120 MG/1
120 CAPSULE, EXTENDED RELEASE ORAL DAILY
Qty: 90 CAPSULE | Refills: 1 | OUTPATIENT
Start: 2025-04-27

## 2025-04-27 RX ORDER — CHLORTHALIDONE 25 MG/1
25 TABLET ORAL
Qty: 90 TABLET | Refills: 0 | OUTPATIENT
Start: 2025-04-27

## 2025-05-10 DIAGNOSIS — N52.9 ERECTILE DYSFUNCTION, UNSPECIFIED ERECTILE DYSFUNCTION TYPE: ICD-10-CM

## 2025-05-12 RX ORDER — TADALAFIL 10 MG/1
TABLET ORAL
Qty: 12 TABLET | Refills: 1 | Status: SHIPPED | OUTPATIENT
Start: 2025-05-12

## 2025-05-21 ENCOUNTER — APPOINTMENT (OUTPATIENT)
Dept: PRIMARY CARE | Facility: CLINIC | Age: 60
End: 2025-05-21
Payer: COMMERCIAL

## 2025-05-21 DIAGNOSIS — R07.89 ATYPICAL CHEST PAIN: ICD-10-CM

## 2025-05-21 DIAGNOSIS — E78.2 MIXED HYPERLIPIDEMIA: ICD-10-CM

## 2025-05-21 DIAGNOSIS — Z91.89 FRAMINGHAM CARDIAC RISK 10-20% IN NEXT 10 YEARS: Chronic | ICD-10-CM

## 2025-05-21 DIAGNOSIS — I10 ESSENTIAL HYPERTENSION: ICD-10-CM

## 2025-05-21 DIAGNOSIS — R73.9 BORDERLINE HYPERGLYCEMIA: ICD-10-CM

## 2025-05-21 DIAGNOSIS — R94.31 ABNORMAL EKG: Chronic | ICD-10-CM

## 2025-05-21 RX ORDER — ASPIRIN 81 MG/1
TABLET ORAL
Qty: 90 TABLET | Refills: 1 | Status: SHIPPED | OUTPATIENT
Start: 2025-05-21

## 2025-06-05 ENCOUNTER — APPOINTMENT (OUTPATIENT)
Dept: PRIMARY CARE | Facility: CLINIC | Age: 60
End: 2025-06-05
Payer: COMMERCIAL

## 2025-06-05 VITALS
HEIGHT: 69 IN | BODY MASS INDEX: 27.73 KG/M2 | SYSTOLIC BLOOD PRESSURE: 149 MMHG | OXYGEN SATURATION: 96 % | HEART RATE: 67 BPM | WEIGHT: 187.2 LBS | DIASTOLIC BLOOD PRESSURE: 94 MMHG

## 2025-06-05 DIAGNOSIS — E66.3 OVERWEIGHT (BMI 25.0-29.9): ICD-10-CM

## 2025-06-05 DIAGNOSIS — K86.89 PANCREATIC MASS (HHS-HCC): Chronic | ICD-10-CM

## 2025-06-05 DIAGNOSIS — K21.9 GASTROESOPHAGEAL REFLUX DISEASE, UNSPECIFIED WHETHER ESOPHAGITIS PRESENT: ICD-10-CM

## 2025-06-05 DIAGNOSIS — D64.9 ANEMIA, NORMOCYTIC NORMOCHROMIC: ICD-10-CM

## 2025-06-05 DIAGNOSIS — E78.2 MIXED HYPERLIPIDEMIA: ICD-10-CM

## 2025-06-05 DIAGNOSIS — I10 ESSENTIAL HYPERTENSION: Primary | ICD-10-CM

## 2025-06-05 DIAGNOSIS — Z91.89 FRAMINGHAM CARDIAC RISK 10-20% IN NEXT 10 YEARS: Chronic | ICD-10-CM

## 2025-06-05 DIAGNOSIS — E55.9 VITAMIN D DEFICIENCY: ICD-10-CM

## 2025-06-05 DIAGNOSIS — G56.02 CARPAL TUNNEL SYNDROME, LEFT: ICD-10-CM

## 2025-06-05 DIAGNOSIS — G43.009 MIGRAINE WITHOUT AURA AND WITHOUT STATUS MIGRAINOSUS, NOT INTRACTABLE: ICD-10-CM

## 2025-06-05 DIAGNOSIS — S06.0X1S CONCUSSION WITH LOSS OF CONSCIOUSNESS OF 30 MINUTES OR LESS, SEQUELA: ICD-10-CM

## 2025-06-05 DIAGNOSIS — R73.9 BORDERLINE HYPERGLYCEMIA: ICD-10-CM

## 2025-06-05 DIAGNOSIS — K64.9 HEMORRHOIDS, UNSPECIFIED HEMORRHOID TYPE: ICD-10-CM

## 2025-06-05 PROBLEM — S06.0X1A CONCUSSION WTH LOSS OF CONSCIOUSNESS OF 30 MINUTES OR LESS: Status: ACTIVE | Noted: 2025-06-05

## 2025-06-05 PROCEDURE — 1036F TOBACCO NON-USER: CPT | Performed by: INTERNAL MEDICINE

## 2025-06-05 PROCEDURE — 3080F DIAST BP >= 90 MM HG: CPT | Performed by: INTERNAL MEDICINE

## 2025-06-05 PROCEDURE — 3008F BODY MASS INDEX DOCD: CPT | Performed by: INTERNAL MEDICINE

## 2025-06-05 PROCEDURE — 99214 OFFICE O/P EST MOD 30 MIN: CPT | Performed by: INTERNAL MEDICINE

## 2025-06-05 PROCEDURE — 3077F SYST BP >= 140 MM HG: CPT | Performed by: INTERNAL MEDICINE

## 2025-06-05 RX ORDER — LOSARTAN POTASSIUM 25 MG/1
TABLET ORAL
Qty: 100 TABLET | Refills: 0 | Status: SHIPPED | OUTPATIENT
Start: 2025-06-05

## 2025-06-05 ASSESSMENT — PROMIS GLOBAL HEALTH SCALE
CARRYOUT_PHYSICAL_ACTIVITIES: COMPLETELY
RATE_PHYSICAL_HEALTH: GOOD
RATE_MENTAL_HEALTH: EXCELLENT
RATE_AVERAGE_FATIGUE: MILD
CARRYOUT_SOCIAL_ACTIVITIES: VERY GOOD
RATE_QUALITY_OF_LIFE: VERY GOOD
EMOTIONAL_PROBLEMS: RARELY
RATE_AVERAGE_PAIN: 2
RATE_SOCIAL_SATISFACTION: EXCELLENT
RATE_GENERAL_HEALTH: GOOD

## 2025-06-05 NOTE — PROGRESS NOTES
Subjective   Patient ID: Figueroa Cabral is a 59 y.o. male who presents for Annual Exam (Physical Exam).    HPI   The patient is here for his yearly physical examination for the year, and for evaluation of hemodynamics, cardiovascular risk.  Just came back after getting .  States that they enjoyed themselves, he and his new bride, and although they were planning to do some hiking, they were caught up in the moment, and did indulge, and he states that he gained perhaps 10 pounds, and has already been successful in losing 5 pounds soon after.  ENDOCRINE with no polyuria, polydipsia, polyphagia.  No blurred vision.  No skin, hair, nail changes.  No dramatic weight loss or weight gain.      Remains physically active.  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  No flora chest pain.  No orthopnea, no paroxysmal nocturnal dyspnea.  No dizziness, diaphoresis, palpitations.  No loss of consciousness.  No bipedal edema.  No remarkable dyspnea on exertion.  No headache, blurred vision, diplopia.  No dysphagia.  No focal weakness, ataxia, clumsiness.  No falls.  No paresthesia.  No confusion or delirium, with patient not worried about memory.  Not depressive or suicidal, with patient not wishing harm to self or others.      States that blood pressure at home has been running high 140s systolically, 90s diastolically.  Compliant with medications, tolerating regimens.  Has been hemodynamically asymptomatic.    Has had some exacerbation of left wrist pain, radial side.  Suspects that he may have carpal tunnel syndrome.  States that this is a chronic condition, which has slowly been getting worse perhaps, but seemingly not enough for him to seek further attention.  Occasional over-the-counter regimens afforded him relief.  No focal weakness.  No paresthesias.    With history of HEMORRHOIDS, no gum or nose bleeding.  No easy bruisability.  No apparent blood in urine or stool.  No constipation, no  "straining.  Likewise, no skin changes, rashes, pruritus, jaundice.  No easy bruisability.    Some occasional NOCTURIA, but nothing persistent.  No hesitation, no obstructive symptoms.  No dysuria, flank, suprapubic pain.  No change in urine character or habits.  The patient states that intimacy has not been with any issue.    No particular cough, no particular sputum production.  CONSTITUTIONALLY, no fever, no chills.  No night sweats.  No lingering anorexia or nausea.  No apparent lymphadenopathy.  No apparent weight loss.          Review of Systems  Review of systems as in history of present illness, and otherwise, reviewed separately as well, and was unremarkable/negative/noncontributory.        Objective   BP (!) 149/94 (BP Location: Left arm, Patient Position: Sitting, BP Cuff Size: Adult)   Pulse 67   Ht 1.753 m (5' 9\")   Wt 84.9 kg (187 lb 3.2 oz)   SpO2 96%   BMI 27.64 kg/m²     Physical Exam  In very good spirits.  Not in distress or diaphoresis.  Alert, oriented x 3.  Amiable.  Not unkempt.  Receptive, cheerful, appropriate, and eager to maintain and hopefully improve quality of life.  Moderately built, but also muscular.  Independent, capable, appropriate.    HEAD pink palpebral conjunctivae, anicteric sclerae.  Mucous membranes moist.  NECK supple, no apparent jugular venous distention.  No carotid bruit.  CARDIOVASCULAR not in distress or diaphoresis.  No bipedal edema.  Regular rate and rhythm.  No murmurs appreciated.  LUNGS not in distress or diaphoresis.  Not using accessory muscles.  Clear to auscultation bilaterally.  ABDOMEN soft, nontender.  No hernias appreciated.  ANUS with no discharge, no stricture.  SCROTUM with no masses, no tenderness.  RECTUM with no active hemorrhoid, no fissuring, no irritation, no bleeding noted.  BACK no costovertebral angle tenderness.  EXTREMITIES no clubbing, no cyanosis.  SKIN with no breakdown, bleeding, jaundice.  NEURO no facial asymmetry.  No apparent " cranial nerve deficits.  Romberg negative.  Ambulating without need of assistance.  No apparent focal weakness.  No tremors.  PSYCH receptive, appropriate, and eager to maintain and improve quality of life.        LABORATORY results due for repeat fasting workup.  October 2024 hemoglobin A1c 4.6, body mass index 27.64, 4 pound weight gain noted.  History of mixed hyperlipidemia, with latest LDL cholesterol 77, triglycerides 214, HDL cholesterol 43.9, total cholesterol 164.  Preserved liver function.  ASCVD risk 9.2%.  Tolerating use of ROSUVASTATIN.  History of normocytic ANEMIA noted, with normal counts October 2024, indexes negative.        Assessment/Plan   Diagnoses and all orders for this visit:  Essential hypertension  -     Follow Up In Primary Care - Established  -     losartan (Cozaar) 25 mg tablet; Please take 1 tablet by mouth with SUPPER every evening.  Lots of fluids please throughout the day.  Thank you.  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  Orders:  -     Follow Up In Primary Care - [facility]; Future  Gastroesophageal reflux disease, unspecified whether esophagitis present  -     Follow Up In Primary Care [facility]; Future  Overweight (BMI 25.0-29.9)  -     Follow Up In Primary Care [facility]; Future  Migraine without aura and without status migrainosus, not intractable  -     Follow Up In Primary Care [facility]; Future  Carpal tunnel syndrome, left  -     Follow Up In Primary Care -  Established; Future  Concussion with loss of consciousness of 30 minutes or less, sequela  Comments:  09/2024  Orders:  -     Follow Up In Primary Care - Established; Future  Hemorrhoids, unspecified hemorrhoid type  -     Follow Up In Primary Care - Established; Future  Vitamin D deficiency  -     Follow Up In Primary Care - Established; Future       Thank you for much for coming.  It is always nice to see you.  Advanced happy birthday!  Congratulations on your wedding!  I am glad to hear that things are working out nicely for you.    You did gain a little bit of weight, but it is insignificant.  I am sure that you can get back into the swing of things.  You are physically active, not only with work, but also with exercise.  Please continue to stay physically active.    Please continue eating sensibly.  [place] diet book, DASH diet, Mediterranean diet for ideas.    You are having some tightness and discomfort along the thumb side of your wrist.  This is consistent with carpal tunnel syndrome.  Wearing a brace while working will help.  Also, IBUPROFEN 200 mg, take 3 tablets, total 600 mg.  Do this every 6 hours as needed for pain.  Always with food please.  If you feel you need more help, please let me know.    Your blood pressure has been elevated lately, not only now, but also at home.  Please continue taking your PROPRANOLOL as well as your CHLORTHALIDONE.  Please ADD LOSARTAN 25 mg by mouth with supper every evening.  Please drink lots of fluids throughout the day and avoid excessive salt.  Very good for blood pressure control, and very good for keeping your kidneys efficient and healthy.  You can still enjoy salt, but the more salt you take in, the more fluid you should add to your regimen!    Also, I am glad to hear that you have been doing lots of walking and hiking.  Also very good for blood pressure control.  Of course, also very good for mood, energy, and weight management!    You do not have any  active HEMORRHOIDS at this time, but if you have any issue, please let me know.  In the meantime, make sure that you stay regular.  Consider METAMUCIL twice a day.  This not only will help bulk up your stool, but it will prevent you from having to strain and aggravate any hemorrhoids.    Again, thank very much for coming.  Please come back in 6 weeks.  Do your FASTING laboratory examinations a few days prior.  Call to make an appointment, so that you will have a slot and will not have to wait to have your lab work done.  Fasting blood and urine examinations, then see me soon after.  Until then, please continue to take care of yourself and your family, and please continue to pray for our recovery from this pandemic.  Take care and God bless.            0  Return in 6 weeks.  20 minutes please.  Fasting laboratory examinations, then see me soon after.  Reassess hemodynamics, consider adjustment of chlorthalidone.  Reassess carpal tunnel symptoms, left.  Reassess mood, energy, function, weight management, preventive strategies, cardiovascular risk.            0  PROPRANOLOL being used for migraine, but likely contributing to blood pressure control, with heart rate in the 60s, patient without any hemodynamic symptoms, asymptomatic.  Patient taking CHLORTHALIDONE with history of HYPONATREMIA.  Diastolic blood pressure noted.  With both systolic and diastolic blood pressure elevated, I will place the patient on LOSARTAN to avoid any electrolyte imbalance.  Supportive care outlined, and the patient will have repeat laboratory examinations in 6 weeks, then see me soon after for reevaluation of hemodynamic response.            0

## 2025-06-05 NOTE — PATIENT INSTRUCTIONS
Thank you for much for coming.  It is always nice to see you.  Advanced happy birthday!  Congratulations on your wedding!  I am glad to hear that things are working out nicely for you.    You did gain a little bit of weight, but it is insignificant.  I am sure that you can get back into the swing of things.  You are physically active, not only with work, but also with exercise.  Please continue to stay physically active.    Please continue eating sensibly.  [employer] diet book, DASH diet, Mediterranean diet for ideas.    You are having some tightness and discomfort along the thumb side of your wrist.  This is consistent with carpal tunnel syndrome.  Wearing a brace while working will help.  Also, IBUPROFEN 200 mg, take 3 tablets, total 600 mg.  Do this every 6 hours as needed for pain.  Always with food please.  If you feel you need more help, please let me know.    Your blood pressure has been elevated lately, not only now, but also at home.  Please continue taking your PROPRANOLOL as well as your CHLORTHALIDONE.  Please ADD LOSARTAN 25 mg by mouth with supper every evening.  Please drink lots of fluids throughout the day and avoid excessive salt.  Very good for blood pressure control, and very good for keeping your kidneys efficient and healthy.  You can still enjoy salt, but the more salt you take in, the more fluid you should add to your regimen!    Also, I am glad to hear that you have been doing lots of walking and hiking.  Also very good for blood pressure control.  Of course, also very good for mood, energy, and weight management!    You do not have any active HEMORRHOIDS at this time, but if you have any issue, please let me know.  In the meantime, make sure that you stay regular.  Consider METAMUCIL twice a day.  This not only will help bulk up your stool, but it will prevent you from having to strain and aggravate any hemorrhoids.    Again, thank very much for coming.  Please come back in 6 weeks.  Do  your FASTING laboratory examinations a few days prior.  Call to make an appointment, so that you will have a slot and will not have to wait to have your lab work done.  Fasting blood and urine examinations, then see me soon after.  Until then, please continue to take care of yourself and your family, and please continue to pray for our recovery from this pandemic.  Take care and God bless.            0  Return in 6 weeks.  20 minutes please.  Fasting laboratory examinations, then see me soon after.  Reassess hemodynamics, consider adjustment of chlorthalidone.  Reassess carpal tunnel symptoms, left.  Reassess mood, energy, function, weight management, preventive strategies, cardiovascular risk.            0

## 2025-07-17 DIAGNOSIS — E78.2 MIXED HYPERLIPIDEMIA: ICD-10-CM

## 2025-07-17 DIAGNOSIS — Z91.89 FRAMINGHAM CARDIAC RISK 10-20% IN NEXT 10 YEARS: Chronic | ICD-10-CM

## 2025-07-17 RX ORDER — ROSUVASTATIN CALCIUM 5 MG/1
5 TABLET, COATED ORAL NIGHTLY
Qty: 90 TABLET | Refills: 1 | Status: SHIPPED | OUTPATIENT
Start: 2025-07-17

## 2025-07-31 ENCOUNTER — APPOINTMENT (OUTPATIENT)
Dept: PRIMARY CARE | Facility: CLINIC | Age: 60
End: 2025-07-31
Payer: COMMERCIAL

## 2025-07-31 VITALS
WEIGHT: 189.7 LBS | OXYGEN SATURATION: 95 % | HEIGHT: 69 IN | DIASTOLIC BLOOD PRESSURE: 93 MMHG | SYSTOLIC BLOOD PRESSURE: 149 MMHG | BODY MASS INDEX: 28.1 KG/M2 | HEART RATE: 63 BPM

## 2025-07-31 DIAGNOSIS — Z12.5 SCREENING PSA (PROSTATE SPECIFIC ANTIGEN): ICD-10-CM

## 2025-07-31 DIAGNOSIS — K86.89 PANCREATIC MASS (HHS-HCC): Chronic | ICD-10-CM

## 2025-07-31 DIAGNOSIS — E66.3 OVERWEIGHT (BMI 25.0-29.9): ICD-10-CM

## 2025-07-31 DIAGNOSIS — E78.2 MIXED HYPERLIPIDEMIA: ICD-10-CM

## 2025-07-31 DIAGNOSIS — E55.9 VITAMIN D DEFICIENCY: ICD-10-CM

## 2025-07-31 DIAGNOSIS — R73.9 BORDERLINE HYPERGLYCEMIA: ICD-10-CM

## 2025-07-31 DIAGNOSIS — Z91.89 FRAMINGHAM CARDIAC RISK 10-20% IN NEXT 10 YEARS: Chronic | ICD-10-CM

## 2025-07-31 DIAGNOSIS — R20.2 PARESTHESIAS: ICD-10-CM

## 2025-07-31 DIAGNOSIS — S06.0X1S CONCUSSION WITH LOSS OF CONSCIOUSNESS OF 30 MINUTES OR LESS, SEQUELA: ICD-10-CM

## 2025-07-31 DIAGNOSIS — K64.9 HEMORRHOIDS, UNSPECIFIED HEMORRHOID TYPE: ICD-10-CM

## 2025-07-31 DIAGNOSIS — G56.02 CARPAL TUNNEL SYNDROME, LEFT: ICD-10-CM

## 2025-07-31 DIAGNOSIS — D64.9 ANEMIA, NORMOCYTIC NORMOCHROMIC: ICD-10-CM

## 2025-07-31 DIAGNOSIS — G43.009 MIGRAINE WITHOUT AURA AND WITHOUT STATUS MIGRAINOSUS, NOT INTRACTABLE: ICD-10-CM

## 2025-07-31 DIAGNOSIS — I10 ESSENTIAL HYPERTENSION: Primary | ICD-10-CM

## 2025-07-31 DIAGNOSIS — K21.9 GASTROESOPHAGEAL REFLUX DISEASE, UNSPECIFIED WHETHER ESOPHAGITIS PRESENT: ICD-10-CM

## 2025-07-31 PROCEDURE — 3008F BODY MASS INDEX DOCD: CPT | Performed by: INTERNAL MEDICINE

## 2025-07-31 PROCEDURE — 99213 OFFICE O/P EST LOW 20 MIN: CPT | Performed by: INTERNAL MEDICINE

## 2025-07-31 PROCEDURE — 3080F DIAST BP >= 90 MM HG: CPT | Performed by: INTERNAL MEDICINE

## 2025-07-31 PROCEDURE — 3077F SYST BP >= 140 MM HG: CPT | Performed by: INTERNAL MEDICINE

## 2025-07-31 ASSESSMENT — PATIENT HEALTH QUESTIONNAIRE - PHQ9
2. FEELING DOWN, DEPRESSED OR HOPELESS: NOT AT ALL
SUM OF ALL RESPONSES TO PHQ9 QUESTIONS 1 AND 2: 0
1. LITTLE INTEREST OR PLEASURE IN DOING THINGS: NOT AT ALL

## 2025-07-31 NOTE — PROGRESS NOTES
"Subjective   Patient ID: Figueroa Cabral is a 60 y.o. male who presents for Follow-up (Patient presented today for a 6 week follow up./).    HPI   The patient has yet to do his FASTING laboratory examinations, but in the meantime, states that he has been feeling very good.  He he does admit that he has not been working out as much, especially since it has been hot outside, but he has been physically active and busy \"doing fun stuff.\"  No flora chest pain.  No orthopnea, no paroxysmal nocturnal dyspnea.  No dizziness, diaphoresis, palpitations.  No loss of consciousness.  No bipedal edema.  No remarkable dyspnea on exertion.      Does state that his LEFT WRIST gets stiff and \"snaps,\" now prompting him to have ORTHOPEDICS reevaluate him.  Otherwise, no headache, blurred vision, no diplopia, no dysphagia.  Continues to want to maintain and hopefully improve quality of life.  Does not wish harm to self or others.  Migraine headaches have been controlled.    Otherwise, states that he has been doing very well.  No particular cough or sputum production.  No abdominal distress.  No dysuria.  No particular skin changes.  CONSTITUTIONALLY, no fever, no chills.  No night sweats.  No lingering anorexia or nausea.  No apparent lymphadenopathy.  No apparent weight loss.        Review of Systems  Review of systems as in history of present illness, and otherwise, reviewed separately as well, and was unremarkable/negative/noncontributory.        Objective   BP (!) 149/93 (BP Location: Left arm, Patient Position: Sitting, BP Cuff Size: Adult)   Pulse 63   Ht 1.753 m (5' 9\")   Wt 86 kg (189 lb 11.2 oz)   SpO2 95%   BMI 28.01 kg/m²     Physical Exam  In good spirits.  Not in distress or diaphoresis.  Alert, oriented x 3.  Amiable.  Not unkempt.  Moderately obese perhaps, but also muscular.  Remaining independent, capable, appropriate.  Eager to improve quality of life.  Does not wish harm to self or others.  Plans to do lab work " soon.    HEAD pink palpebral conjunctivae, anicteric sclerae.  NECK supple, no apparent jugular venous distention.  CARDIOVASCULAR not in distress or diaphoresis.  No bipedal edema.  LUNGS not in distress or diaphoresis.  Not using accessory muscles.  ABDOMEN soft, nontender.  BACK no costovertebral angle tenderness.  EXTREMITIES no clubbing, no cyanosis.  NEURO no facial asymmetry.  No apparent cranial nerve deficits.  Romberg negative.  Ambulating without need of assistance.  No apparent focal weakness.  No tremors.  PSYCH receptive, appropriate, and eager to maintain and improve quality of life.        Assessment/Plan   Diagnoses and all orders for this visit:  Essential hypertension  -     Follow Up In Primary Care - Established  -     Follow Up In Primary Care - Established; Future  Mixed hyperlipidemia  -     Follow Up In Primary Care - Established; Future  Borderline hyperglycemia  -     Follow Up In Primary Care - Established; Future  [place] cardiac risk 10-20% in next 10 years  Comments:  12.6% 07/2024  Orders:  -     Follow Up In Primary Care - Established; Future  Anemia, normocytic normochromic  -     Follow Up In Primary Care - Established; Future  Pancreatic mass ([facility])  Comments:  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  Orders:  -     Follow Up In Primary Care - Established; Future  Gastroesophageal reflux disease, unspecified whether esophagitis present  -     Follow Up In Primary Care - Established; Future  Overweight (BMI 25.0-29.9)  -     Follow Up In Primary Care - Established; Future  Migraine without aura and without status migrainosus, not intractable  -     Follow Up In Primary Care - Established; Future  Carpal tunnel syndrome, left  -     Referral to Orthopedics and Sports Medicine; Future  -     Follow Up In Primary Care - Established; Future  Concussion with loss of consciousness of 30 minutes or less, sequela  Comments:  09/2024  Orders:  -     Follow  "Up In Primary Care - Established; Future  Hemorrhoids, unspecified hemorrhoid type  -     Follow Up In Primary Care - Established; Future  Vitamin D deficiency  -     Follow Up In Primary Care - Established; Future  Screening PSA (prostate specific antigen)  -     Prostate Specific Antigen, Screen; Future  -     Follow Up In Primary Care - Established; Future  Paresthesias  -     Vitamin B12; Future  -     Folate; Future  -     Follow Up In Primary Care - Established; Future       Thank very much for coming.  It is very nice to see you.  I am glad that you have been busy doing \"happy stuff,\" and it is also important to make sure that you are taking care of yourself!    FASTING laboratory examinations anytime soon.  You can have these done at the  facility close to home.  Please make sure that you get an appointment first, so that you do not have to wait too long.  Please let them know that you have 2 sets of orders, from January of this year, and again from this month.  Please get both sets done.  These will include a screening PSA recommended for your age group.    You will benefit from seeing Dr. Motley, ORTHOPEDICS, regarding your LEFT WRIST.  This is a good idea!    Please continue to stay physically active with lots of brisk walking, cycling, swimming, outside of work.  These are very good in helping you control your blood pressure.    Likewise, please continue to drink lots of fluids and avoid salt.  Also very good for blood pressure and also good for kidneys!    Please check your blood pressure in a calm and resting state.  Seated position, feet flat on the floor.  Use the backrest of the chair.  Please call me if at rest, your heart rate is 90 or higher.  Please call me if at rest, the SYSTOLIC blood pressure is persistently 145 or higher.  Please call me if at rest, the DIASTOLIC blood pressure is persistently 88 or higher.  I can make adjustments over the telephone until I see you again.    Please let " me see again in 3 months.  Until then, let me call you with laboratory results and possible changes.  Please call me with any questions or concerns.    When you get a chance, look into getting vaccinated for SHINGLES.  SHINGRIX comes in a series of 2 injections at least 2 months apart.  Highly recommended, paid for by insurance, available from your local friendly pharmacy, protected for life!  Please consider getting this done.    Again, thank you very much for coming.  See you in 2 months.  Take care and God bless.            0  Return in 2 months.  20 minutes please.  Reassess hemodynamics, cardiovascular risk, kidney function.  Reassess options regarding weight management, carpal tunnel syndrome, history of migraine.  Prepare for winter.            0  The patient will do his laboratory examinations soon, and I will let him know if he has any electrolyte imbalances that need to be addressed.  He will call me with blood pressure readings.  At home, he states he is running 140s over 70s.            0

## 2025-07-31 NOTE — PATIENT INSTRUCTIONS
"Thank very much for coming.  It is very nice to see you.  I am glad that you have been busy doing \"happy stuff,\" and it is also important to make sure that you are taking care of yourself!    FASTING laboratory examinations anytime soon.  You can have these done at the  facility close to home.  Please make sure that you get an appointment first, so that you do not have to wait too long.  Please let them know that you have 2 sets of orders, from January of this year, and again from this month.  Please get both sets done.  These will include a screening PSA recommended for your age group.    You will benefit from seeing Dr. Motley, ORTHOPEDICS, regarding your LEFT WRIST.  This is a good idea!    Please continue to stay physically active with lots of brisk walking, cycling, swimming, outside of work.  These are very good in helping you control your blood pressure.    Likewise, please continue to drink lots of fluids and avoid salt.  Also very good for blood pressure and also good for kidneys!    Please check your blood pressure in a calm and resting state.  Seated position, feet flat on the floor.  Use the backrest of the chair.  Please call me if at rest, your heart rate is 90 or higher.  Please call me if at rest, the SYSTOLIC blood pressure is persistently 145 or higher.  Please call me if at rest, the DIASTOLIC blood pressure is persistently 88 or higher.  I can make adjustments over the telephone until I see you again.    Please let me see again in 3 months.  Until then, let me call you with laboratory results and possible changes.  Please call me with any questions or concerns.    When you get a chance, look into getting vaccinated for SHINGLES.  SHINGRIX comes in a series of 2 injections at least 2 months apart.  Highly recommended, paid for by insurance, available from your local friendly pharmacy, protected for life!  Please consider getting this done.    Again, thank you very much for coming.  See you in 2 " months.  Take care and God bless.            0  Return in 2 months.  20 minutes please.  Reassess hemodynamics, cardiovascular risk, kidney function.  Reassess options regarding weight management, carpal tunnel syndrome, history of migraine.  Prepare for winter.            0

## 2025-08-04 LAB — PSA SERPL-MCNC: 0.48 NG/ML

## 2025-08-05 LAB
25(OH)D3+25(OH)D2 SERPL-MCNC: 52 NG/ML (ref 30–100)
ALBUMIN SERPL-MCNC: 4.3 G/DL (ref 3.6–5.1)
ALP SERPL-CCNC: 52 U/L (ref 35–144)
ALT SERPL-CCNC: 18 U/L (ref 9–46)
ANION GAP SERPL CALCULATED.4IONS-SCNC: 8 MMOL/L (CALC) (ref 7–17)
APPEARANCE UR: CLEAR
AST SERPL-CCNC: 17 U/L (ref 10–35)
BACTERIA #/AREA URNS HPF: ABNORMAL /HPF
BACTERIA UR CULT: ABNORMAL
BACTERIA UR CULT: ABNORMAL
BASOPHILS # BLD AUTO: 103 CELLS/UL (ref 0–200)
BASOPHILS NFR BLD AUTO: 1.8 %
BILIRUB SERPL-MCNC: 0.6 MG/DL (ref 0.2–1.2)
BILIRUB UR QL STRIP: NEGATIVE
BUN SERPL-MCNC: 20 MG/DL (ref 7–25)
CALCIUM SERPL-MCNC: 9.2 MG/DL (ref 8.6–10.3)
CHLORIDE SERPL-SCNC: 101 MMOL/L (ref 98–110)
CHOLEST SERPL-MCNC: 165 MG/DL
CHOLEST/HDLC SERPL: 3.7 (CALC)
CO2 SERPL-SCNC: 30 MMOL/L (ref 20–32)
COLOR UR: YELLOW
CREAT SERPL-MCNC: 0.73 MG/DL (ref 0.7–1.3)
EGFRCR SERPLBLD CKD-EPI 2021: 105 ML/MIN/1.73M2
EOSINOPHIL # BLD AUTO: 388 CELLS/UL (ref 15–500)
EOSINOPHIL NFR BLD AUTO: 6.8 %
ERYTHROCYTE [DISTWIDTH] IN BLOOD BY AUTOMATED COUNT: 12.4 % (ref 11–15)
EST. AVERAGE GLUCOSE BLD GHB EST-MCNC: 117 MG/DL
EST. AVERAGE GLUCOSE BLD GHB EST-SCNC: 6.5 MMOL/L
FERRITIN SERPL-MCNC: 82 NG/ML (ref 38–380)
FOLATE SERPL-MCNC: 21.3 NG/ML
GLUCOSE SERPL-MCNC: 104 MG/DL (ref 65–99)
GLUCOSE UR QL STRIP: NEGATIVE
HBA1C MFR BLD: 5.7 %
HCT VFR BLD AUTO: 43.2 % (ref 38.5–50)
HDLC SERPL-MCNC: 45 MG/DL
HGB BLD-MCNC: 14.3 G/DL (ref 13.2–17.1)
HGB UR QL STRIP: NEGATIVE
HYALINE CASTS #/AREA URNS LPF: ABNORMAL /LPF
IRON SATN MFR SERPL: 22 % (CALC) (ref 20–48)
IRON SERPL-MCNC: 89 MCG/DL (ref 50–180)
KETONES UR QL STRIP: NEGATIVE
LDLC SERPL CALC-MCNC: 90 MG/DL (CALC)
LEUKOCYTE ESTERASE UR QL STRIP: ABNORMAL
LIPASE SERPL-CCNC: 47 U/L (ref 7–60)
LYMPHOCYTES # BLD AUTO: 1351 CELLS/UL (ref 850–3900)
LYMPHOCYTES NFR BLD AUTO: 23.7 %
MAGNESIUM SERPL-MCNC: 2 MG/DL (ref 1.5–2.5)
MCH RBC QN AUTO: 29.9 PG (ref 27–33)
MCHC RBC AUTO-ENTMCNC: 33.1 G/DL (ref 32–36)
MCV RBC AUTO: 90.2 FL (ref 80–100)
MONOCYTES # BLD AUTO: 650 CELLS/UL (ref 200–950)
MONOCYTES NFR BLD AUTO: 11.4 %
NEUTROPHILS # BLD AUTO: 3209 CELLS/UL (ref 1500–7800)
NEUTROPHILS NFR BLD AUTO: 56.3 %
NITRITE UR QL STRIP: NEGATIVE
NONHDLC SERPL-MCNC: 120 MG/DL (CALC)
PH UR STRIP: 5.5 [PH] (ref 5–8)
PLATELET # BLD AUTO: 229 THOUSAND/UL (ref 140–400)
PMV BLD REES-ECKER: 9.8 FL (ref 7.5–12.5)
POTASSIUM SERPL-SCNC: 3.6 MMOL/L (ref 3.5–5.3)
PROT SERPL-MCNC: 6.6 G/DL (ref 6.1–8.1)
PROT UR QL STRIP: NEGATIVE
RBC # BLD AUTO: 4.79 MILLION/UL (ref 4.2–5.8)
RBC #/AREA URNS HPF: ABNORMAL /HPF
SERVICE CMNT-IMP: ABNORMAL
SODIUM SERPL-SCNC: 139 MMOL/L (ref 135–146)
SP GR UR STRIP: 1.02 (ref 1–1.03)
SQUAMOUS #/AREA URNS HPF: ABNORMAL /HPF
TIBC SERPL-MCNC: 410 MCG/DL (CALC) (ref 250–425)
TRIGL SERPL-MCNC: 204 MG/DL
TSH SERPL-ACNC: 1.13 MIU/L (ref 0.4–4.5)
VIT B12 SERPL-MCNC: 1117 PG/ML (ref 200–1100)
WBC # BLD AUTO: 5.7 THOUSAND/UL (ref 3.8–10.8)
WBC #/AREA URNS HPF: ABNORMAL /HPF

## 2025-08-17 DIAGNOSIS — I10 ESSENTIAL HYPERTENSION: ICD-10-CM

## 2025-08-18 RX ORDER — CHLORTHALIDONE 25 MG/1
25 TABLET ORAL
Qty: 90 TABLET | Refills: 0 | Status: SHIPPED | OUTPATIENT
Start: 2025-08-18

## 2025-08-25 ENCOUNTER — APPOINTMENT (OUTPATIENT)
Dept: ORTHOPEDIC SURGERY | Facility: CLINIC | Age: 60
End: 2025-08-25
Payer: COMMERCIAL

## 2025-08-25 ENCOUNTER — HOSPITAL ENCOUNTER (OUTPATIENT)
Dept: RADIOLOGY | Facility: HOSPITAL | Age: 60
Discharge: HOME | End: 2025-08-25
Payer: COMMERCIAL

## 2025-08-25 DIAGNOSIS — M19.049 CMC ARTHRITIS: ICD-10-CM

## 2025-08-25 PROCEDURE — 73140 X-RAY EXAM OF FINGER(S): CPT | Mod: LT

## 2025-08-25 PROCEDURE — 20600 DRAIN/INJ JOINT/BURSA W/O US: CPT | Performed by: ORTHOPAEDIC SURGERY

## 2025-08-25 PROCEDURE — 99214 OFFICE O/P EST MOD 30 MIN: CPT | Performed by: ORTHOPAEDIC SURGERY

## 2025-08-25 PROCEDURE — 73140 X-RAY EXAM OF FINGER(S): CPT | Mod: LEFT SIDE | Performed by: RADIOLOGY

## 2025-08-25 RX ORDER — TRIAMCINOLONE ACETONIDE 10 MG/ML
5 INJECTION, SUSPENSION INTRA-ARTICULAR; INTRALESIONAL
Status: COMPLETED | OUTPATIENT
Start: 2025-08-25 | End: 2025-08-25

## 2025-08-25 RX ORDER — LIDOCAINE HYDROCHLORIDE 10 MG/ML
0.5 INJECTION, SOLUTION INFILTRATION; PERINEURAL
Status: COMPLETED | OUTPATIENT
Start: 2025-08-25 | End: 2025-08-25

## 2025-08-25 RX ADMIN — TRIAMCINOLONE ACETONIDE 5 MG: 10 INJECTION, SUSPENSION INTRA-ARTICULAR; INTRALESIONAL at 08:25

## 2025-08-25 RX ADMIN — LIDOCAINE HYDROCHLORIDE 0.5 ML: 10 INJECTION, SOLUTION INFILTRATION; PERINEURAL at 08:25

## 2025-08-27 DIAGNOSIS — I10 ESSENTIAL HYPERTENSION: ICD-10-CM

## 2025-08-28 RX ORDER — LOSARTAN POTASSIUM 25 MG/1
TABLET ORAL
Qty: 90 TABLET | Refills: 1 | Status: SHIPPED | OUTPATIENT
Start: 2025-08-28

## 2025-10-07 ENCOUNTER — APPOINTMENT (OUTPATIENT)
Dept: PRIMARY CARE | Facility: CLINIC | Age: 60
End: 2025-10-07
Payer: COMMERCIAL

## 2025-11-03 ENCOUNTER — APPOINTMENT (OUTPATIENT)
Dept: PRIMARY CARE | Facility: CLINIC | Age: 60
End: 2025-11-03
Payer: COMMERCIAL

## 2026-01-26 ENCOUNTER — APPOINTMENT (OUTPATIENT)
Dept: PRIMARY CARE | Facility: CLINIC | Age: 61
End: 2026-01-26
Payer: COMMERCIAL